# Patient Record
Sex: FEMALE | Race: WHITE | NOT HISPANIC OR LATINO | Employment: FULL TIME | URBAN - METROPOLITAN AREA
[De-identification: names, ages, dates, MRNs, and addresses within clinical notes are randomized per-mention and may not be internally consistent; named-entity substitution may affect disease eponyms.]

---

## 2017-03-31 ENCOUNTER — OFFICE VISIT (OUTPATIENT)
Dept: URGENT CARE | Facility: RETAIL CLINIC | Age: 47
End: 2017-03-31
Payer: COMMERCIAL

## 2017-03-31 VITALS
OXYGEN SATURATION: 99 % | DIASTOLIC BLOOD PRESSURE: 82 MMHG | HEART RATE: 77 BPM | SYSTOLIC BLOOD PRESSURE: 124 MMHG | TEMPERATURE: 99.5 F

## 2017-03-31 DIAGNOSIS — J06.9 ACUTE URI: ICD-10-CM

## 2017-03-31 DIAGNOSIS — R52 BODY ACHES: ICD-10-CM

## 2017-03-31 DIAGNOSIS — Z20.828 EXPOSURE TO INFLUENZA: Primary | ICD-10-CM

## 2017-03-31 LAB
FLUAV AG UPPER RESP QL IA.RAPID: NEGATIVE
FLUBV AG UPPER RESP QL IA.RAPID: NEGATIVE

## 2017-03-31 PROCEDURE — 87804 INFLUENZA ASSAY W/OPTIC: CPT | Mod: QW | Performed by: PHYSICIAN ASSISTANT

## 2017-03-31 PROCEDURE — 99213 OFFICE O/P EST LOW 20 MIN: CPT | Performed by: PHYSICIAN ASSISTANT

## 2017-03-31 RX ORDER — GUAIFENESIN 600 MG/1
1200 TABLET, EXTENDED RELEASE ORAL 2 TIMES DAILY
COMMUNITY
End: 2018-05-22

## 2017-03-31 NOTE — LETTER
[facility]  [address] [facility] [address]    March 31, 2017        Liliana Poole  [address] [address] [place] [id number]          To whom it may concern:    Patient was seen and evaluated today at our clinic to acute illness. Please excuse from work missed due to this illness today.    Please contact me for questions or concerns.        Sincerely,        Cristel Ann PA-C

## 2017-03-31 NOTE — PATIENT INSTRUCTIONS
Rapid A and B influenza test were negative    Your symptoms appear to be viral at this time.  Nasal congestion often starts clear then turns yellow or green towards the end- this is not a sign of a bacterial infection.  May use netti pot with bottled or distilled water and saline packets to flush sinuses.  Sudafed behind the pharmacist counter for 3-5 days helps relieve congestion  Take Tylenol or ibuprofen or naproxen as needed for pain.  Mucinex (guiafenesin) thins mucus and may help it to loosen more quickly  Saline drops or nasal sprays may loosen mucus.  Sit in the bathroom with the door closed and hot shower running to loosen mucus.  Contact primary care clinic if you do not have any relief from your symptoms after 10 days.  Present to emergency room for significantly increasing pain, persistent high fever >102F, swelling/redness around your eyes, changes in your vision or ability to move your eyes, altered mental status or a severe headache.    Follow up with primary care provider with any problems, questions or concerns or if symptoms worsen or fail to improve.

## 2017-03-31 NOTE — MR AVS SNAPSHOT
After Visit Summary   3/31/2017    Liliana Poole    MRN: [id number]           Patient Information     Date Of Birth          1970        Visit Information        Provider Department      3/31/2017 12:50 PM Cristel Ann PA-C [facility]        Today's Diagnoses     Exposure to influenza    -  1    Body aches        Acute URI          Care Instructions    Rapid A and B influenza test were negative    Your symptoms appear to be viral at this time.  Nasal congestion often starts clear then turns yellow or green towards the end- this is not a sign of a bacterial infection.  May use netti pot with bottled or distilled water and saline packets to flush sinuses.  Sudafed behind the pharmacist counter for 3-5 days helps relieve congestion  Take Tylenol or ibuprofen or naproxen as needed for pain.  Mucinex (guiafenesin) thins mucus and may help it to loosen more quickly  Saline drops or nasal sprays may loosen mucus.  Sit in the bathroom with the door closed and hot shower running to loosen mucus.  Contact primary care clinic if you do not have any relief from your symptoms after 10 days.  Present to emergency room for significantly increasing pain, persistent high fever >102F, swelling/redness around your eyes, changes in your vision or ability to move your eyes, altered mental status or a severe headache.    Follow up with primary care provider with any problems, questions or concerns or if symptoms worsen or fail to improve.          Follow-ups after your visit        Who to contact     You can reach your care team any time of the day by calling [phone].  Notification of test results:  If you have an abnormal lab result, we will notify you by phone as soon as possible.         Additional Information About Your Visit        MyChart Information     My [employer] [facility] lets you send messages to your doctor, view your test results, renew your prescriptions, schedule  "appointments and more. To sign up, go to [email or web address] . Click on \"Log in\" on the left side of the screen, which will take you to the Welcome page. Then click on \"Sign up Now\" on the right side of the page.     You will be asked to enter the access code listed below, as well as some personal information. Please follow the directions to create your username and password.     Your access code is: [email or web address]  Expires: 2017  1:37 PM     Your access code will  in 90 days. If you need help or a new code, please call your [place] clinic or [phone].        Care EveryWhere ID     This is your [facility] EveryWhere ID. This could be used by other organizations to access your [place] medical records  [id number]        Your Vitals Were     Pulse Temperature Pulse Oximetry             77 99.5  F (37.5  C) (Oral) 99%          Blood Pressure from Last 3 Encounters:   17 124/82   10/03/16 124/73   16 112/69    Weight from Last 3 Encounters:   10/03/16 211 lb 4.8 oz (95.8 kg)   16 202 lb (91.6 kg)   14 203 lb (92.1 kg)              We Performed the Following     INFLUENZA A/B ANTIGEN        Primary Care Provider Office Phone # Fax #    Francisca Long [phone]       [address] [address]        Thank you!     Thank you for choosing [facility]  for your care. Our goal is always to provide you with excellent care. Hearing back from our patients is one way we can continue to improve our services. Please take a few minutes to complete the written survey that you may receive in the mail after your visit with us. Thank you!             Your Updated Medication List - Protect others around you: Learn how to safely use, store and throw away your medicines at www.disposemymeds.org.          This list is accurate as of: 3/31/17  1:37 PM.  Always use your most recent med list.                   Brand Name Dispense " Instructions for use    aspirin 162 MG EC tablet      Take 162 mg by mouth daily       celecoxib 200 MG capsule    celeBREX    21 capsule    Take 1 capsule (200 mg) by mouth daily for 21 days       MOTRIN IB PO          MUCINEX 600 MG 12 hr tablet   Generic drug:  guaiFENesin      Take 1,200 mg by mouth 2 times daily       oxyCODONE-acetaminophen 5-325 MG per tablet    PERCOCET    60 tablet    Take 1-2 tablets by mouth every 6 hours as needed for moderate to severe pain       REGLAN PO      Take 10 mg by mouth       RELPAX 40 MG tablet   Generic drug:  eletriptan      Take 40 mg by mouth at onset of headache for migraine As needed       scopolamine 72 hr patch    TRANSDERM     Place 1 patch onto the skin every 72 hours Reported on 3/31/2017       SUDAFED PO          TYLENOL PO          ZOFRAN PO

## 2017-03-31 NOTE — PROGRESS NOTES
Chief Complaint   Patient presents with     Sinus Problem     pressure, congestion in face; 1-2 days     Cough     1-2 days     Chills     began 2 nights ago     Headache     began 2 nights ago     Generalized Body Aches     1-2 days          SUBJECTIVE:  Patient presents with flu-like symptoms:  Fevers, chills, myalgias, sinus congestion began 2 days ago.  Denies dyspnea or wheezing. Did not get a flu shot. Works in health care    Past Medical History:   Diagnosis Date     GERD (gastroesophageal reflux disease)      Hip pain      History of Helicobacter pylori infection      Hyperlipidaemia      Hypertension      Insomnia      Meniere's disease      Migraines      Motion sickness      Obese      Patent foramen ovale      PFO (patent foramen ovale)     SUKHWINDER 8/18/2010,echo 6/6/2008     PONV (postoperative nausea and vomiting)      Current Outpatient Prescriptions   Medication Sig Dispense Refill     guaiFENesin (MUCINEX) 600 MG 12 hr tablet Take 1,200 mg by mouth 2 times daily       Acetaminophen (TYLENOL PO)        Pseudoephedrine HCl (SUDAFED PO)        MOTRIN IB PO        Ondansetron HCl (ZOFRAN PO)        Metoclopramide HCl (REGLAN PO) Take 10 mg by mouth       aspirin 162 MG EC tablet Take 162 mg by mouth daily       eletriptan (RELPAX) 40 MG tablet Take 40 mg by mouth at onset of headache for migraine As needed       scopolamine (TRANSDERM) (1.5mg base/3day) patch Place 1 patch onto the skin every 72 hours Reported on 3/31/2017       oxyCODONE-acetaminophen (PERCOCET) 5-325 MG per tablet Take 1-2 tablets by mouth every 6 hours as needed for moderate to severe pain (Patient not taking: Reported on 3/31/2017) 60 tablet 0     celecoxib (CELEBREX) 200 MG capsule Take 1 capsule (200 mg) by mouth daily for 21 days 21 capsule 0        Allergies   Allergen Reactions     Atorvastatin      Muscle aches     Contrast Dye Hives     Crestor [Rosuvastatin]      Muscle aches     Pravastatin      Muscle aches     Simvastatin       Muscle aches     Sulfa Drugs      Augmented Betamethasone Diprop [Betamethasone] Rash     Augmentin Rash     Verapamil Rash        History   Smoking Status     Never Smoker   Smokeless Tobacco     Not on file       OBJECITVE;  /82 (BP Location: Left arm)  Pulse 77  Temp 99.5  F (37.5  C) (Oral)  SpO2 99%  Appears moderately ill but not toxic.  EARS:  Normal.  Nose:: mucosa pink, boggy  THROAT AND PHARYNX:  Normal.  NECK: supple; no adenopathy in the neck.  SINUSES: non tender.  CHEST:  clear.    Rapid influenza A negative, influenza B negative    ASSESSMENT:  (Z20.828) Exposure to influenza  (primary encounter diagnosis)  (R52) Body aches  (J06.9) Acute URI      PLAN:   Rapid A and B influenza test were negative  Your symptoms appear to be viral at this time.  Nasal congestion often starts clear then turns yellow or green towards the end- this is not a sign of a bacterial infection.  May use netti pot with bottled or distilled water and saline packets to flush sinuses.  Sudafed behind the pharmacist counter for 3-5 days helps relieve congestion  Take Tylenol or ibuprofen or naproxen as needed for pain.  Mucinex (guiafenesin) thins mucus and may help it to loosen more quickly  Saline drops or nasal sprays may loosen mucus.  Sit in the bathroom with the door closed and hot shower running to loosen mucus.  Contact primary care clinic if you do not have any relief from your symptoms after 10 days.  Present to emergency room for significantly increasing pain, persistent high fever >102F, swelling/redness around your eyes, changes in your vision or ability to move your eyes, altered mental status or a severe headache.  Follow up with primary care provider with any problems, questions or concerns or if symptoms worsen or fail to improve.    Cristel Ann PA-C  [facility]

## 2017-03-31 NOTE — NURSING NOTE
"Chief Complaint   Patient presents with     Sinus Problem     pressure, congestion in face; 1-2 days     Cough     1-2 days     Chills     began 2 nights ago     Headache     began 2 nights ago     Generalized Body Aches     1-2 days       Initial /82 (BP Location: Left arm)  Pulse 77  Temp 99.5  F (37.5  C) (Oral)  SpO2 99% Estimated body mass index is 35.16 kg/(m^2) as calculated from the following:    Height as of 10/3/16: 5' 5\" (1.651 m).    Weight as of 10/3/16: 211 lb 4.8 oz (95.8 kg).  Medication Reconciliation: complete  "

## 2017-03-31 NOTE — LETTER
[facility]  [address] [facility] [address]  Phone: [phone]    March 31, 2017        Liliana Poole  [address] [address] [place] [id number]          To whom it may concern:    RE: Liliana Poole    Patient was seen and evaluated today at our clinic to acute illness. Please excuse from work missed due to this illness today.    Please contact me for questions or concerns.      Sincerely,        Cristel Ann PA-C

## 2017-10-02 ENCOUNTER — TELEPHONE (OUTPATIENT)
Dept: OTOLARYNGOLOGY | Facility: OTHER | Age: 47
End: 2017-10-02

## 2017-10-02 ENCOUNTER — OFFICE VISIT (OUTPATIENT)
Dept: URGENT CARE | Facility: RETAIL CLINIC | Age: 47
End: 2017-10-02
Payer: COMMERCIAL

## 2017-10-02 VITALS — HEART RATE: 67 BPM | SYSTOLIC BLOOD PRESSURE: 133 MMHG | DIASTOLIC BLOOD PRESSURE: 79 MMHG | TEMPERATURE: 98.4 F

## 2017-10-02 DIAGNOSIS — H65.02 ACUTE SEROUS OTITIS MEDIA OF LEFT EAR, RECURRENCE NOT SPECIFIED: Primary | ICD-10-CM

## 2017-10-02 DIAGNOSIS — R09.81 NASAL SINUS CONGESTION: ICD-10-CM

## 2017-10-02 DIAGNOSIS — H92.02 LEFT EAR PAIN: ICD-10-CM

## 2017-10-02 DIAGNOSIS — H81.09 MENIERE'S DISEASE, UNSPECIFIED LATERALITY: Primary | ICD-10-CM

## 2017-10-02 DIAGNOSIS — Z86.69 HISTORY OF MENIERE'S DISEASE: ICD-10-CM

## 2017-10-02 PROCEDURE — 99213 OFFICE O/P EST LOW 20 MIN: CPT | Performed by: PHYSICIAN ASSISTANT

## 2017-10-02 RX ORDER — ONDANSETRON 4 MG/1
4 TABLET, FILM COATED ORAL EVERY 8 HOURS PRN
Qty: 18 TABLET | Refills: 0 | Status: SHIPPED | OUTPATIENT
Start: 2017-10-02

## 2017-10-02 RX ORDER — METHYLPREDNISOLONE 4 MG
TABLET, DOSE PACK ORAL
Qty: 21 TABLET | Refills: 0 | Status: SHIPPED | OUTPATIENT
Start: 2017-10-02 | End: 2018-05-22

## 2017-10-02 RX ORDER — HYDROXYZINE PAMOATE 25 MG/1
CAPSULE ORAL
COMMUNITY
Start: 2017-08-28 | End: 2018-05-22

## 2017-10-02 RX ORDER — CEPHALEXIN 500 MG/1
500 CAPSULE ORAL 2 TIMES DAILY
Qty: 20 CAPSULE | Refills: 0 | Status: SHIPPED | OUTPATIENT
Start: 2017-10-02 | End: 2018-05-22

## 2017-10-02 RX ORDER — PREDNISONE 20 MG/1
20 TABLET ORAL DAILY
Qty: 5 TABLET | Refills: 0 | Status: SHIPPED | OUTPATIENT
Start: 2017-10-02 | End: 2018-05-22

## 2017-10-02 NOTE — PROGRESS NOTES
Chief Complaint   Patient presents with     Otalgia     Left; 4-5 days; very painful, migrating down her neck; using sudafed, antihistamines; pt has meniers disease     Dizziness     Nausea         SUBJECTIVE:   Pt. presenting to [facility] -  with a chief complaint of sense of fullness and pain left ear, nasal sinus congestion, tired, mild malaise. . No SOB or chest pain .  Onset of symptoms gradual x 5 days  Course of illness is worsening.    Severity moderate  Current and Associated symptoms: runny nose, stuffy nose, ear pain left, facial pain/pressure and mild malaise  Treatment measures tried include Decongestants, Fluids and Rest.  Predisposing factors include hx of menieres, tried to see ENT today  -see tel enc 11:40 am  Last antibiotic > year  Took steroids spring 2017 for menieres flare    Pregnancy no  Smoker no    ROS:  Afebrile   Energy level is sl <  ENT - denies ear pain, throat pain. some nasal congestion  CP - no cough,SOB or chest pain   GI- - appetite normal. No nausea, vomiting or diarrhea.   No bowel or bladder changes   MSK - no joint pain or swelling   Skin: no rashes      Request refill Zofran in case she devlops nausea from Meniers    Past Medical History:   Diagnosis Date     GERD (gastroesophageal reflux disease)      Hip pain      History of Helicobacter pylori infection      Hyperlipidaemia      Hypertension      Insomnia      Meniere's disease      Migraines      Motion sickness      Obese      Patent foramen ovale      PFO (patent foramen ovale)     SUKHWINDER 8/18/2010,echo 6/6/2008     PONV (postoperative nausea and vomiting)      Past Surgical History:   Procedure Laterality Date     ARTHROSCOPY ANKLE, OPEN REPAIR LIGAMENT, COMBINED Right 10/3/2016    Procedure: COMBINED ARTHROSCOPY ANKLE, OPEN REPAIR LIGAMENT;  Surgeon: Mk Stephenson MD;  Location: [facility]     CHOLECYSTECTOMY       FINGER SURGERY      fx of right little finger     LAPAROSCOPIC CHOLECYSTECTOMY  2011      ROTATOR CUFF REPAIR RT/LT       ROTATOR CUFF REPAIR RT/LT       uterine ablation       varicose vein ablation       Patient Active Problem List   Diagnosis     Family history of ischemic heart disease (FAMILY HX-ISCHEM HEART DIS ISCHEM CARDIOVASCULAR ISCHEMIC)     Mixed hyperlipidemia     Ostium secundum type atrial septal defect (FORAMEN)     Current Outpatient Prescriptions   Medication     Pseudoephedrine HCl (SUDAFED PO)     Ondansetron HCl (ZOFRAN PO)     guaiFENesin (MUCINEX) 600 MG 12 hr tablet     Acetaminophen (TYLENOL PO)     MOTRIN IB PO     Metoclopramide HCl (REGLAN PO)     scopolamine (TRANSDERM) (1.5mg base/3day) patch     oxyCODONE-acetaminophen (PERCOCET) 5-325 MG per tablet     celecoxib (CELEBREX) 200 MG capsule     aspirin 162 MG EC tablet     eletriptan (RELPAX) 40 MG tablet     No current facility-administered medications for this visit.          OBJECTIVE: /79 (BP Location: Left arm)  Pulse 67  Temp 98.4  F (36.9  C) (Oral)          GENERAL APPEARANCE: cooperative, alert and no distress. Appears well hydrated.  EYES: conjunctiva clear  HENT: Rt ear canal  clear and TM normal   Lt ear canal clear and TM abn - mild erythema and no light reflex - distorted landmarks  Nose some congestion. no discharge  Mouth without ulcers or lesions. no erythema. no exudate.   NECK: supple, few small shoddy NT ant nodes. No  posterior nodes.  RESP: lungs clear to auscultation - no rales, rhonchi or wheezes. Breathing easily.  CV: regular rates and rhythm  ABDOMEN:  soft, nontender, no HSM or masses and bowel sounds normal   SKIN: no suspicious lesions or rashes  no tenderness to palpate over  sinus areas.      ASSESSMENT:     Left ear pain  History of Meniere's disease  Acute serous otitis media of left ear, recurrence not specified  Nasal sinus congestion      PLAN:  Symptomatic measures   Prescriptions as below. Discussed indications, dosing, side affects and adverse reactions of medications with   Patient - Ceph, Zofran and Prednisone  Eat yogurt daily or take a probiotic supplement when on antibiotics.  saline nasal spray - gentle autoinsufflation - discussed probable ETD    Cool mist vaporizer.  Stay in clean air environment.  > rest.  > fluids.  Contagiousness and hygiene discussed.  Fever and pain  control measures discussed.   If unable to swallow or any breathing difficulty to go to ED     FOLLOW UP with ENT/PCP as planned        Pt is comfortable with this plan.  Electronically signed,  VICKY Mckenna, PAC

## 2017-10-02 NOTE — NURSING NOTE
"Chief Complaint   Patient presents with     Otalgia     Left; 4-5 days; very painful, migrating down her neck; using sudafed, antihistamines; pt has meniers disease     Dizziness     Nausea       Initial /79 (BP Location: Left arm)  Pulse 67  Temp 98.4  F (36.9  C) (Oral) Estimated body mass index is 35.16 kg/(m^2) as calculated from the following:    Height as of 10/3/16: 5' 5\" (1.651 m).    Weight as of 10/3/16: 211 lb 4.8 oz (95.8 kg).  Medication Reconciliation: complete   "

## 2017-10-02 NOTE — TELEPHONE ENCOUNTER
Reason for Call:  Same Day Appointment, Requested Provider:  L[place]     PCP: Francisca Alves    Reason for visit: ears    Duration of symptoms: on going    Have you been treated for this in the past? Yes    Additional comments: First opening is for 110/01/2017 patient would like top be seen before this     Can we leave a detailed message on this number? YES    Phone number patient can be reached at:     Telephone Information:   Mobile [phone]       Best Time: anytime    Call taken on 10/2/2017 at 11:17 AM by Susan Haddad

## 2017-10-02 NOTE — PATIENT INSTRUCTIONS
Please FOLLOW UP at primary care clinic if not improving, new symptoms, worse or this does not resolve.  [facility]  [phone]

## 2017-10-02 NOTE — TELEPHONE ENCOUNTER
SHe can try a medrol dose pack. I sent this to pharmacy [facility] in [facility]. Please let her know. Also advise her to eat a low salt diet, avoid caffeine, chocolate, and alcohol.

## 2017-10-02 NOTE — TELEPHONE ENCOUNTER
Dr. Golden, are you willing to review as Dr. Ha is out of town until 10/12/17?      Returned patient call. Informed her that MD is out of clinic for a few weeks. Patient is a former [facility] patient transitioning to Dr. Ha through [facility]. Patient has Ménière disease and is having pain to her left ear, radiating to left eustachian tube. Pain 6/10 described as pressure. Onset gradual beginning 3 days ago. Last time symptoms arose was last spring. Per patient report, Was prescribed a medrol pack (helped), antihistamine's (makes her too tired but willing to try), and hydrochlorothiazide (didn't tolerate, caused migraines). Would like any scripts sent to [facility] [place].    Writer offered to make her an appointment with another ENT provider in [place] or route to another provider to see if they could review. Patient chose 2nd option. Will route to Dr. Golden in [place]. Writer will check on encounter when I'm back in the clinic Wednesday. Patient verbalizes understanding and will call back if needed.  Kimi Plummer RN  [facility]  10/2/2017 11:54 AM

## 2017-10-02 NOTE — MR AVS SNAPSHOT
"              After Visit Summary   10/2/2017    Liliana Poole    MRN: [id number]           Patient Information     Date Of Birth          1970        Visit Information        Provider Department      10/2/2017 12:50 PM Payal Mckenna PA-C [facility]        Today's Diagnoses     Left ear pain    -  1    History of Meniere's disease        Acute serous otitis media of left ear, recurrence not specified        Nasal sinus congestion          Care Instructions      Please FOLLOW UP at primary care clinic if not improving, new symptoms, worse or this does not resolve.  [facility]  [phone]            Follow-ups after your visit        Who to contact     You can reach your care team any time of the day by calling [phone].  Notification of test results:  If you have an abnormal lab result, we will notify you by phone as soon as possible.         Additional Information About Your Visit        MyChart Information     [employer] lets you send messages to your doctor, view your test results, renew your prescriptions, schedule appointments and more. To sign up, go to [email or web address] . Click on \"Log in\" on the left side of the screen, which will take you to the Welcome page. Then click on \"Sign up Now\" on the right side of the page.     You will be asked to enter the access code listed below, as well as some personal information. Please follow the directions to create your username and password.     Your access code is: [email or web address]  Expires: 2017  2:02 PM     Your access code will  in 90 days. If you need help or a new code, please call your [facility] or [phone].        Care EveryWhere ID     This is your Care EveryWhere ID. This could be used by other organizations to access your [place] medical records  [id number]        Your Vitals Were     Pulse Temperature                67 98.4  F (36.9  C) (Oral)           Blood Pressure from " Last 3 Encounters:   10/02/17 133/79   03/31/17 124/82   10/03/16 124/73    Weight from Last 3 Encounters:   10/03/16 211 lb 4.8 oz (95.8 kg)   09/30/16 202 lb (91.6 kg)   05/28/14 203 lb (92.1 kg)              Today, you had the following     No orders found for display         Today's Medication Changes          These changes are accurate as of: 10/2/17  2:02 PM.  If you have any questions, ask your nurse or doctor.               Start taking these medicines.        Dose/Directions    cephALEXin 500 MG capsule   Commonly known as:  KEFLEX   Used for:  Acute serous otitis media of left ear, recurrence not specified   Started by:  Payal Mckenna PA-C        Dose:  500 mg   Take 1 capsule (500 mg) by mouth 2 times daily   Quantity:  20 capsule   Refills:  0       predniSONE 20 MG tablet   Commonly known as:  DELTASONE   Used for:  History of Meniere's disease, Acute serous otitis media of left ear, recurrence not specified, Nasal sinus congestion   Started by:  Payal Mckenna PA-C        Dose:  20 mg   Take 1 tablet (20 mg) by mouth daily   Quantity:  5 tablet   Refills:  0         These medicines have changed or have updated prescriptions.        Dose/Directions    * ZOFRAN PO   This may have changed:  Another medication with the same name was added. Make sure you understand how and when to take each.        Refills:  0       * ondansetron 4 MG tablet   Commonly known as:  ZOFRAN   This may have changed:  You were already taking a medication with the same name, and this prescription was added. Make sure you understand how and when to take each.   Used for:  History of Meniere's disease   Changed by:  Payal Mckenna PA-C        Dose:  4 mg   Take 1 tablet (4 mg) by mouth every 8 hours as needed for nausea   Quantity:  18 tablet   Refills:  0       * Notice:  This list has 2 medication(s) that are the same as other medications prescribed for you. Read the directions carefully, and ask your doctor or other  care provider to review them with you.         Where to get your medicines      These medications were sent to [facility] [address] [place], [place] - [address] [facility]  [address] [facility] [address]     Phone:  [phone]     cephALEXin 500 MG capsule    ondansetron 4 MG tablet    predniSONE 20 MG tablet                Primary Care Provider Office Phone # Fax #    Francisca Alves [phone]       [facility] [address] [facility] [address]        Equal Access to Services     [facility]: Hadii aad ku hadasho Soomaali, waaxda luqadaha, qaybta kaalmada adeegyada, waxay uzielin hayaan manuel best . So [facility] [phone].    ATENCIÓN: Si habla español, tiene a hunter disposición servicios gratuitos de asistencia lingüística. [facility] [phone].    We comply with applicable federal civil rights laws and [place] laws. We do not discriminate on the basis of race, color, national origin, age, disability, sex, sexual orientation, or gender identity.            Thank you!     Thank you for choosing [facility]  for your care. Our goal is always to provide you with excellent care. Hearing back from our patients is one way we can continue to improve our services. Please take a few minutes to complete the written survey that you may receive in the mail after your visit with us. Thank you!             Your Updated Medication List - Protect others around you: Learn how to safely use, store and throw away your medicines at www.disposemymeds.org.          This list is accurate as of: 10/2/17  2:02 PM.  Always use your most recent med list.                   Brand Name Dispense Instructions for use Diagnosis    aspirin 162 MG EC tablet      Take 162 mg by mouth daily        celecoxib 200 MG capsule    celeBREX    21 capsule    Take 1 capsule (200 mg) by mouth daily for 21 days    Hip pain, right       cephALEXin 500 MG capsule    KEFLEX    20 capsule    Take  1 capsule (500 mg) by mouth 2 times daily    Acute serous otitis media of left ear, recurrence not specified       hydrOXYzine 25 MG capsule    VISTARIL          MOTRIN IB PO           MUCINEX 600 MG 12 hr tablet   Generic drug:  guaiFENesin      Take 1,200 mg by mouth 2 times daily        oxyCODONE-acetaminophen 5-325 MG per tablet    PERCOCET    60 tablet    Take 1-2 tablets by mouth every 6 hours as needed for moderate to severe pain    Hip pain, right       predniSONE 20 MG tablet    DELTASONE    5 tablet    Take 1 tablet (20 mg) by mouth daily    History of Meniere's disease, Acute serous otitis media of left ear, recurrence not specified, Nasal sinus congestion       REGLAN PO      Take 10 mg by mouth        RELPAX 40 MG tablet   Generic drug:  eletriptan      Take 40 mg by mouth at onset of headache for migraine As needed        scopolamine 72 hr patch    TRANSDERM     Place 1 patch onto the skin every 72 hours Reported on 3/31/2017        SUDAFED PO           TYLENOL PO           * ZOFRAN PO           * ondansetron 4 MG tablet    ZOFRAN    18 tablet    Take 1 tablet (4 mg) by mouth every 8 hours as needed for nausea    History of Meniere's disease       * Notice:  This list has 2 medication(s) that are the same as other medications prescribed for you. Read the directions carefully, and ask your doctor or other care provider to review them with you.

## 2017-10-04 NOTE — TELEPHONE ENCOUNTER
Called patient and informed her of the below message.  Kimi Plummer RN  [facility]  10/4/2017 3:31 PM

## 2017-11-01 ENCOUNTER — OFFICE VISIT (OUTPATIENT)
Dept: OTOLARYNGOLOGY | Facility: OTHER | Age: 47
End: 2017-11-01
Payer: COMMERCIAL

## 2017-11-01 ENCOUNTER — OFFICE VISIT (OUTPATIENT)
Dept: AUDIOLOGY | Facility: OTHER | Age: 47
End: 2017-11-01
Payer: COMMERCIAL

## 2017-11-01 VITALS — HEART RATE: 85 BPM | RESPIRATION RATE: 18 BRPM | OXYGEN SATURATION: 99 %

## 2017-11-01 DIAGNOSIS — G43.909 MIGRAINE WITHOUT STATUS MIGRAINOSUS, NOT INTRACTABLE, UNSPECIFIED MIGRAINE TYPE: ICD-10-CM

## 2017-11-01 DIAGNOSIS — H93.13 TINNITUS OF BOTH EARS: ICD-10-CM

## 2017-11-01 DIAGNOSIS — H90.3 SENSORINEURAL HEARING LOSS, ASYMMETRICAL: Primary | ICD-10-CM

## 2017-11-01 DIAGNOSIS — H81.03 MENIERE'S DISEASE, BILATERAL: Primary | ICD-10-CM

## 2017-11-01 DIAGNOSIS — H90.6 MIXED CONDUCTIVE AND SENSORINEURAL HEARING LOSS OF BOTH EARS: ICD-10-CM

## 2017-11-01 DIAGNOSIS — M54.2 TENDERNESS OF NECK: ICD-10-CM

## 2017-11-01 PROCEDURE — 99207 ZZC NO CHARGE LOS: CPT | Performed by: AUDIOLOGIST

## 2017-11-01 PROCEDURE — 92567 TYMPANOMETRY: CPT | Performed by: AUDIOLOGIST

## 2017-11-01 PROCEDURE — 92557 COMPREHENSIVE HEARING TEST: CPT | Performed by: AUDIOLOGIST

## 2017-11-01 PROCEDURE — 99214 OFFICE O/P EST MOD 30 MIN: CPT | Performed by: OTOLARYNGOLOGY

## 2017-11-01 NOTE — LETTER
11/1/2017         RE: Liliana Poole  [address] [address] [place] [id number]        Dear Colleague,    Thank you for referring your patient, Liliana Poole, to the [facility]. Please see a copy of my visit note below.    ENT Consultation    Liliana Poole is a 47 year old female who is seen in consultation at the request of former [facility] pt.       History of Present Illness - Liliana Poole is a 47 year old female here today for Meniere's disease. Has had several episodes of pain and pressure in the left ear. She has recently been on a course of prednisone from the [facility] that did help for a few days with pain, but shortly became infective. Patient has a history of severe migraines. She has been on dieretics in the past that trigger her migraines. She has also tried Verapamil that presented with a rash.    Past Medical History -   Past Medical History:   Diagnosis Date     GERD (gastroesophageal reflux disease)      Hip pain      History of Helicobacter pylori infection      Hyperlipidaemia      Hypertension      Insomnia      Meniere's disease      Migraines      Motion sickness      Obese      Patent foramen ovale      PFO (patent foramen ovale)     SUKHWINDER 8/18/2010,echo 6/6/2008     PONV (postoperative nausea and vomiting)        Current Medications -   Current Outpatient Prescriptions:      hydrOXYzine (VISTARIL) 25 MG capsule, , Disp: , Rfl:      ondansetron (ZOFRAN) 4 MG tablet, Take 1 tablet (4 mg) by mouth every 8 hours as needed for nausea, Disp: 18 tablet, Rfl: 0     predniSONE (DELTASONE) 20 MG tablet, Take 1 tablet (20 mg) by mouth daily, Disp: 5 tablet, Rfl: 0     cephALEXin (KEFLEX) 500 MG capsule, Take 1 capsule (500 mg) by mouth 2 times daily, Disp: 20 capsule, Rfl: 0     methylPREDNISolone (MEDROL DOSEPAK) 4 MG tablet, Follow package instructions, Disp: 21 tablet, Rfl: 0     guaiFENesin (MUCINEX) 600 MG 12 hr tablet, Take 1,200 mg by  mouth 2 times daily, Disp: , Rfl:      Acetaminophen (TYLENOL PO), , Disp: , Rfl:      Pseudoephedrine HCl (SUDAFED PO), , Disp: , Rfl:      MOTRIN IB PO, , Disp: , Rfl:      Ondansetron HCl (ZOFRAN PO), , Disp: , Rfl:      Metoclopramide HCl (REGLAN PO), Take 10 mg by mouth, Disp: , Rfl:      scopolamine (TRANSDERM) (1.5mg base/3day) patch, Place 1 patch onto the skin every 72 hours Reported on 3/31/2017, Disp: , Rfl:      oxyCODONE-acetaminophen (PERCOCET) 5-325 MG per tablet, Take 1-2 tablets by mouth every 6 hours as needed for moderate to severe pain (Patient not taking: Reported on 3/31/2017), Disp: 60 tablet, Rfl: 0     celecoxib (CELEBREX) 200 MG capsule, Take 1 capsule (200 mg) by mouth daily for 21 days, Disp: 21 capsule, Rfl: 0     aspirin 162 MG EC tablet, Take 162 mg by mouth daily, Disp: , Rfl:      eletriptan (RELPAX) 40 MG tablet, Take 40 mg by mouth at onset of headache for migraine As needed, Disp: , Rfl:     Allergies -   Allergies   Allergen Reactions     Atorvastatin      Muscle aches     Contrast Dye Hives     Crestor [Rosuvastatin]      Muscle aches     Pravastatin      Muscle aches     Simvastatin      Muscle aches     Sulfa Drugs      Augmented Betamethasone Diprop [Betamethasone] Rash     Augmentin Rash     Verapamil Rash       Social History -   Social History     Social History     Marital status: Single     Spouse name: N/A     Number of children: N/A     Years of education: N/A     Social History Main Topics     Smoking status: Never Smoker     Smokeless tobacco: Not on file     Alcohol use Yes     Drug use: No     Sexual activity: Not on file     Other Topics Concern     Exercise Yes     treadmill, weights, kettlebells     Social History Narrative       Family History -   Family History   Problem Relation Age of Onset     Hypertension Mother      Hypertension Father      C.A.D. Father      CABG in 60s       Review of Systems - As per HPI and PMHx, otherwise review of system review of  the head and neck negative.    Physical Exam  There were no vitals taken for this visit.  BMI: There is no height or weight on file to calculate BMI.    General - The patient is well nourished and well developed, and appears to have good nutritional status.  Alert and oriented to person and place, answers questions and cooperates with examination appropriately.    SKIN - No suspicious lesions or rashes.  Respiration - No respiratory distress.     Head and Face - Normocephalic and atraumatic, with no gross asymmetry noted of the contour of the facial features.  The facial nerve is intact, with strong symmetric movements.    Voice and Breathing - The patient was breathing comfortably without the use of accessory muscles. There was no wheezing, stridor, or stertor.  The patients voice was clear and strong, and had appropriate pitch and quality.    Ears - Bilateral pinna and EACs with normal appearing overlying skin. Tympanic membrane intact with good mobility on pneumatic otoscopy bilaterally. Bony landmarks of the ossicular chain are normal. The tympanic membranes are normal in appearance. No retraction, perforation, or masses.  No fluid or purulence was seen in the external canal or the middle ear.     Eyes - Extraocular movements intact.  Sclera were not icteric or injected, conjunctiva were pink and moist.    Mouth - Examination of the oral cavity showed pink, healthy oral mucosa. No lesions or ulcerations noted.  The tongue was mobile and midline, and the dentition were in good condition.      Throat - The walls of the oropharynx were smooth, pink, moist, symmetric, and had no lesions or ulcerations.  The tonsillar pillars and soft palate were symmetric.  The uvula was midline on elevation.    Neck - Normal midline excursion of the laryngotracheal complex during swallowing.  Full range of motion on passive movement.  Palpation of the occipital, submental, submandibular, internal jugular chain, and supraclavicular  nodes did not demonstrate any abnormal lymph nodes or masses.  The carotid pulse was palpable bilaterally.  Palpation of the thyroid was soft and smooth, with no nodules or goiter appreciated.  The trachea was mobile and midline. SCM tenderness.    Nose - External contour is symmetric, no gross deflection or scars.  Nasal mucosa is pink and moist with no abnormal mucus.  The septum was midline and non-obstructive, turbinates of normal size and position.  No polyps, masses, or purulence noted on examination.    Neuro - Nonfocal neuro exam is normal, CN 2 through 12 intact, normal gait and muscle tone. Rombreg with eyes closed with unsteadiness and a backwards fall.        Performed in clinic today:  Audiologic Studies - An audiogram and tympanogram were performed today as part of the evaluation and personally reviewed. The tympanogram shows normal Type A curves, with normal canal volumes and middle ear pressures.  There is no sign of eustachian tube dysfunction or middle ear effusion.  The audiogram was also normal.  Patient had 100% word recognition. Patient exhibits mild borderline hearing loss in the right ear and mild hearing loss in the left ear.           A/P - Liliana Poloe is a 47 year old female with previous diagnosis of Meniere's left side but now also significant elements of Vestibular Migraine. WE  her on this disorder and answer many questions. Plan therapy with Amytriptalline instead of Calcium channel blockers to which she had rash reaction and also has low BP.  WE spent 45 min with the patient more than half in counseling. Even steroid ME perfusion is discussed as future option.          This document serves as a record of the services and decisions personally performed and made by Dr. Julian Ha MD. It was created on his behalf by Ewelina Fritz, a trained medical scribe. The creation of this document is based the provider's statements to the medical scribe.  Ewelina Fritz  10:23 AM 11/1/2017    Provider:   The information in this document, created by the medical scribe for me, accurately reflects the services I personally performed and the decisions made by me. I have reviewed and approved this document for accuracy prior to leaving the patient care area.  Dr. Julian Ha MD 10:23 AM 11/1/2017    Julian Ha MD      Again, thank you for allowing me to participate in the care of your patient.        Sincerely,        Julian Ha MD, MD

## 2017-11-01 NOTE — MR AVS SNAPSHOT
After Visit Summary   11/1/2017    Liliana Poole    MRN: [id number]           Patient Information     Date Of Birth          1970        Visit Information        Provider Department      11/1/2017 9:30 AM Anson Garcia AuD [facility]        Today's Diagnoses     Sensorineural hearing loss, asymmetrical    -  1    Tinnitus of both ears           Follow-ups after your visit        Your next 10 appointments already scheduled     Nov 08, 2017 11:20 AM CST   (Arrive by 11:05 AM)   [facility] Spine with Kayode Varela PT   [facility] ([facility]  )    [address]  [facility] [address]   [phone]            Nov 14, 2017 12:50 PM CST   SHANNON Spine with Kayode Varela PT   [facility] ([facility]  )    [address]  [facility] [address]   [phone]            Dec 06, 2017 10:30 AM CST   Return Visit with Julian Ha MD   [facility]    [address] [place] [address]  [address]  [facility] [address] [phone]              Who to contact     If you have questions or need follow up information about today's clinic visit or your schedule please contact [facility] directly at [phone].  Normal or non-critical lab and imaging results will be communicated to you by MyChart, letter or phone within 4 business days after the clinic has received the results. If you do not hear from us within 7 days, please contact the clinic through MyChart or phone. If you have a critical or abnormal lab result, we will notify you by phone as soon as possible.  Submit refill requests through [employer] or call your pharmacy and they will forward the refill request to us. Please allow 3 business days for your refill to be completed.          Additional Information About Your Visit        MyChart Information      "[employer] lets you send messages to your doctor, view your test results, renew your prescriptions, schedule appointments and more. To sign up, go to [email or web address] . Click on \"Log in\" on the left side of the screen, which will take you to the Welcome page. Then click on \"Sign up Now\" on the right side of the page.     You will be asked to enter the access code listed below, as well as some personal information. Please follow the directions to create your username and password.     Your access code is: [email or web address]  Expires: 2017  2:02 PM     Your access code will  in 90 days. If you need help or a new code, please call your [place] clinic or [phone].        Care EveryWhere ID     This is your Care EveryWhere ID. This could be used by other organizations to access your [place] medical records  [id number]         Blood Pressure from Last 3 Encounters:   10/02/17 133/79   17 124/82   10/03/16 124/73    Weight from Last 3 Encounters:   10/03/16 211 lb 4.8 oz (95.8 kg)   16 202 lb (91.6 kg)   14 203 lb (92.1 kg)              We Performed the Following     AUDIOGRAM/TYMPANOGRAM - INTERFACE     COMPREHENSIVE HEARING TEST     TYMPANOMETRY          Today's Medication Changes          These changes are accurate as of: 17 12:09 PM.  If you have any questions, ask your nurse or doctor.               Start taking these medicines.        Dose/Directions    amitriptyline 25 MG tablet   Commonly known as:  ELAVIL   Used for:  Migraine without status migrainosus, not intractable, unspecified migraine type   Started by:  Julian Ha MD        Dose:  25 mg   Take 1 tablet (25 mg) by mouth At Bedtime   Quantity:  30 tablet   Refills:  1            Where to get your medicines      [facility]  [place] [place] [facility] [address] [facility]  [address] [facility] [address]     Phone:  [phone]     amitriptyline 25 MG " tablet                Primary Care Provider Office Phone # Fax #    Francisca Alves [phone]       [facility] [address] [facility] [address]        Equal Access to Services     DAVID NUNEZ : Hadii aad ku hadremio Solilianali, waaxda luqadaha, qaybta kaalmada adealiciada, trevor uzielin hayaaalfredo zamudiodaxarah del rosario. So [facility] [phone].    ATENCIÓN: Si habla español, tiene a hunter disposición servicios gratuitos de asistencia lingüística. [facility] [phone].    We comply with applicable federal civil rights laws and [place] laws. We do not discriminate on the basis of race, color, national origin, age, disability, sex, sexual orientation, or gender identity.            Thank you!     Thank you for choosing [facility]  for your care. Our goal is always to provide you with excellent care. Hearing back from our patients is one way we can continue to improve our services. Please take a few minutes to complete the written survey that you may receive in the mail after your visit with us. Thank you!             Your Updated Medication List - Protect others around you: Learn how to safely use, store and throw away your medicines at www.disposemymeds.org.          This list is accurate as of: 11/1/17 12:09 PM.  Always use your most recent med list.                   Brand Name Dispense Instructions for use Diagnosis    amitriptyline 25 MG tablet    ELAVIL    30 tablet    Take 1 tablet (25 mg) by mouth At Bedtime    Migraine without status migrainosus, not intractable, unspecified migraine type       aspirin 162 MG EC tablet      Take 162 mg by mouth daily        celecoxib 200 MG capsule    celeBREX    21 capsule    Take 1 capsule (200 mg) by mouth daily for 21 days    Hip pain, right       cephALEXin 500 MG capsule    KEFLEX    20 capsule    Take 1 capsule (500 mg) by mouth 2 times daily    Acute serous otitis media of left ear, recurrence not specified       hydrOXYzine 25 MG  capsule    VISTARIL          methylPREDNISolone 4 MG tablet    MEDROL DOSEPAK    21 tablet    Follow package instructions    Meniere's disease, unspecified laterality       MOTRIN IB PO           MUCINEX 600 MG 12 hr tablet   Generic drug:  guaiFENesin      Take 1,200 mg by mouth 2 times daily        oxyCODONE-acetaminophen 5-325 MG per tablet    PERCOCET    60 tablet    Take 1-2 tablets by mouth every 6 hours as needed for moderate to severe pain    Hip pain, right       predniSONE 20 MG tablet    DELTASONE    5 tablet    Take 1 tablet (20 mg) by mouth daily    History of Meniere's disease, Acute serous otitis media of left ear, recurrence not specified, Nasal sinus congestion       REGLAN PO      Take 10 mg by mouth        RELPAX 40 MG tablet   Generic drug:  eletriptan      Take 40 mg by mouth at onset of headache for migraine As needed        scopolamine 72 hr patch    TRANSDERM     Place 1 patch onto the skin every 72 hours Reported on 3/31/2017        SUDAFED PO           TYLENOL PO           * ZOFRAN PO           * ondansetron 4 MG tablet    ZOFRAN    18 tablet    Take 1 tablet (4 mg) by mouth every 8 hours as needed for nausea    History of Meniere's disease       * Notice:  This list has 2 medication(s) that are the same as other medications prescribed for you. Read the directions carefully, and ask your doctor or other care provider to review them with you.

## 2017-11-01 NOTE — NURSING NOTE
"Chief Complaint   Patient presents with     Consult     Former [facility] pt     Ear Problem     Ménière disease        Initial Pulse 85  Resp 18  SpO2 99% Estimated body mass index is 35.16 kg/(m^2) as calculated from the following:    Height as of 10/3/16: 1.651 m (5' 5\").    Weight as of 10/3/16: 95.8 kg (211 lb 4.8 oz).  Medication Reconciliation: complete  "

## 2017-11-01 NOTE — PROGRESS NOTES
ENT Consultation    Liliana Poole is a 47 year old female who is seen in consultation at the request of former [facility] pt.       History of Present Illness - Liliana Poole is a 47 year old female here today for Meniere's disease. Has had several episodes of pain and pressure in the left ear. She has recently been on a course of prednisone from the [facility] that did help for a few days with pain, but shortly became infective. Patient has a history of severe migraines. She has been on dieretics in the past that trigger her migraines. She has also tried Verapamil that presented with a rash.    Past Medical History -   Past Medical History:   Diagnosis Date     GERD (gastroesophageal reflux disease)      Hip pain      History of Helicobacter pylori infection      Hyperlipidaemia      Hypertension      Insomnia      Meniere's disease      Migraines      Motion sickness      Obese      Patent foramen ovale      PFO (patent foramen ovale)     SUKHWINDER 8/18/2010,echo 6/6/2008     PONV (postoperative nausea and vomiting)        Current Medications -   Current Outpatient Prescriptions:      hydrOXYzine (VISTARIL) 25 MG capsule, , Disp: , Rfl:      ondansetron (ZOFRAN) 4 MG tablet, Take 1 tablet (4 mg) by mouth every 8 hours as needed for nausea, Disp: 18 tablet, Rfl: 0     predniSONE (DELTASONE) 20 MG tablet, Take 1 tablet (20 mg) by mouth daily, Disp: 5 tablet, Rfl: 0     cephALEXin (KEFLEX) 500 MG capsule, Take 1 capsule (500 mg) by mouth 2 times daily, Disp: 20 capsule, Rfl: 0     methylPREDNISolone (MEDROL DOSEPAK) 4 MG tablet, Follow package instructions, Disp: 21 tablet, Rfl: 0     guaiFENesin (MUCINEX) 600 MG 12 hr tablet, Take 1,200 mg by mouth 2 times daily, Disp: , Rfl:      Acetaminophen (TYLENOL PO), , Disp: , Rfl:      Pseudoephedrine HCl (SUDAFED PO), , Disp: , Rfl:      MOTRIN IB PO, , Disp: , Rfl:      Ondansetron HCl (ZOFRAN PO), , Disp: , Rfl:      Metoclopramide HCl (REGLAN PO), Take 10 mg  by mouth, Disp: , Rfl:      scopolamine (TRANSDERM) (1.5mg base/3day) patch, Place 1 patch onto the skin every 72 hours Reported on 3/31/2017, Disp: , Rfl:      oxyCODONE-acetaminophen (PERCOCET) 5-325 MG per tablet, Take 1-2 tablets by mouth every 6 hours as needed for moderate to severe pain (Patient not taking: Reported on 3/31/2017), Disp: 60 tablet, Rfl: 0     celecoxib (CELEBREX) 200 MG capsule, Take 1 capsule (200 mg) by mouth daily for 21 days, Disp: 21 capsule, Rfl: 0     aspirin 162 MG EC tablet, Take 162 mg by mouth daily, Disp: , Rfl:      eletriptan (RELPAX) 40 MG tablet, Take 40 mg by mouth at onset of headache for migraine As needed, Disp: , Rfl:     Allergies -   Allergies   Allergen Reactions     Atorvastatin      Muscle aches     Contrast Dye Hives     Crestor [Rosuvastatin]      Muscle aches     Pravastatin      Muscle aches     Simvastatin      Muscle aches     Sulfa Drugs      Augmented Betamethasone Diprop [Betamethasone] Rash     Augmentin Rash     Verapamil Rash       Social History -   Social History     Social History     Marital status: Single     Spouse name: N/A     Number of children: N/A     Years of education: N/A     Social History Main Topics     Smoking status: Never Smoker     Smokeless tobacco: Not on file     Alcohol use Yes     Drug use: No     Sexual activity: Not on file     Other Topics Concern     Exercise Yes     treadmill, weights, kettlebells     Social History Narrative       Family History -   Family History   Problem Relation Age of Onset     Hypertension Mother      Hypertension Father      C.A.D. Father      CABG in 60s       Review of Systems - As per HPI and PMHx, otherwise review of system review of the head and neck negative.    Physical Exam  There were no vitals taken for this visit.  BMI: There is no height or weight on file to calculate BMI.    General - The patient is well nourished and well developed, and appears to have good nutritional status.  Alert and  oriented to person and place, answers questions and cooperates with examination appropriately.    SKIN - No suspicious lesions or rashes.  Respiration - No respiratory distress.     Head and Face - Normocephalic and atraumatic, with no gross asymmetry noted of the contour of the facial features.  The facial nerve is intact, with strong symmetric movements.    Voice and Breathing - The patient was breathing comfortably without the use of accessory muscles. There was no wheezing, stridor, or stertor.  The patients voice was clear and strong, and had appropriate pitch and quality.    Ears - Bilateral pinna and EACs with normal appearing overlying skin. Tympanic membrane intact with good mobility on pneumatic otoscopy bilaterally. Bony landmarks of the ossicular chain are normal. The tympanic membranes are normal in appearance. No retraction, perforation, or masses.  No fluid or purulence was seen in the external canal or the middle ear.     Eyes - Extraocular movements intact.  Sclera were not icteric or injected, conjunctiva were pink and moist.    Mouth - Examination of the oral cavity showed pink, healthy oral mucosa. No lesions or ulcerations noted.  The tongue was mobile and midline, and the dentition were in good condition.      Throat - The walls of the oropharynx were smooth, pink, moist, symmetric, and had no lesions or ulcerations.  The tonsillar pillars and soft palate were symmetric.  The uvula was midline on elevation.    Neck - Normal midline excursion of the laryngotracheal complex during swallowing.  Full range of motion on passive movement.  Palpation of the occipital, submental, submandibular, internal jugular chain, and supraclavicular nodes did not demonstrate any abnormal lymph nodes or masses.  The carotid pulse was palpable bilaterally.  Palpation of the thyroid was soft and smooth, with no nodules or goiter appreciated.  The trachea was mobile and midline. SCM tenderness.    Nose - External  contour is symmetric, no gross deflection or scars.  Nasal mucosa is pink and moist with no abnormal mucus.  The septum was midline and non-obstructive, turbinates of normal size and position.  No polyps, masses, or purulence noted on examination.    Neuro - Nonfocal neuro exam is normal, CN 2 through 12 intact, normal gait and muscle tone. Rombreg with eyes closed with unsteadiness and a backwards fall.        Performed in clinic today:  Audiologic Studies - An audiogram and tympanogram were performed today as part of the evaluation and personally reviewed. The tympanogram shows normal Type A curves, with normal canal volumes and middle ear pressures.  There is no sign of eustachian tube dysfunction or middle ear effusion.  The audiogram was also normal.  Patient had 100% word recognition. Patient exhibits mild borderline hearing loss in the right ear and mild hearing loss in the left ear.           A/P - Liliana Poole is a 47 year old female with previous diagnosis of Meniere's left side but now also significant elements of Vestibular Migraine. WE  her on this disorder and answer many questions. Plan therapy with Amytriptalline instead of Calcium channel blockers to which she had rash reaction and also has low BP.  WE spent 45 min with the patient more than half in counseling. Even steroid ME perfusion is discussed as future option.          This document serves as a record of the services and decisions personally performed and made by Dr. Julian Ha MD. It was created on his behalf by Ewelina Fritz, a trained medical scribe. The creation of this document is based the provider's statements to the medical scribe.  Ewelina Fritz 10:23 AM 11/1/2017    Provider:   The information in this document, created by the medical scribe for me, accurately reflects the services I personally performed and the decisions made by me. I have reviewed and approved this document for accuracy prior to leaving the  patient care area.  Dr. Julian Ha MD 10:23 AM 11/1/2017    Julian Ha MD

## 2017-11-01 NOTE — MR AVS SNAPSHOT
"              After Visit Summary   11/1/2017    Liliana Poole    MRN: [id number]           Patient Information     Date Of Birth          1970        Visit Information        Provider Department      11/1/2017 10:00 AM Julian Ha MD [facility]        Today's Diagnoses     Meniere's disease, bilateral    -  1    Mixed conductive and sensorineural hearing loss of both ears        Tenderness of neck        Migraine without status migrainosus, not intractable, unspecified migraine type           Follow-ups after your visit        Additional Services     AUDIOLOGY ADULT REFERRAL           PHYSICAL THERAPY REFERRAL       *This therapy referral will be filtered to a centralized scheduling office at [facility] and the patient will receive a call to schedule an appointment at a [place] location most convenient for them. *     [facility] provides Physical Therapy evaluation and treatment and many specialty services across the [place] system.  If requesting a specialty program, please choose from the list below.    If you have not heard from the scheduling office within 2 business days, please call [phone] for all locations, with the exception of [place], please call [phone].  Treatment: Evaluation & Treatment  Special Instructions/Modalities: none  Special Programs: none    Please be aware that coverage of these services is subject to the terms and limitations of your health insurance plan.  Call member services at your health plan with any benefit or coverage questions.      **Note to Provider:  If you are referring outside of [place] for the therapy appointment, please list the name of the location in the \"special instructions\" above, print the referral and give to the patient to schedule the appointment.                  Who to contact     If you have questions or need follow up information about today's clinic visit or your " "schedule please contact [facility] [place] directly at [phone].  Normal or non-critical lab and imaging results will be communicated to you by MyChart, letter or phone within 4 business days after the clinic has received the results. If you do not hear from us within 7 days, please contact the clinic through MyChart or phone. If you have a critical or abnormal lab result, we will notify you by phone as soon as possible.  Submit refill requests through dotHIV or call your pharmacy and they will forward the refill request to us. Please allow 3 business days for your refill to be completed.          Additional Information About Your Visit        [employer] Information     dotHIV lets you send messages to your doctor, view your test results, renew your prescriptions, schedule appointments and more. To sign up, go to [email or web address] . Click on \"Log in\" on the left side of the screen, which will take you to the Welcome page. Then click on \"Sign up Now\" on the right side of the page.     You will be asked to enter the access code listed below, as well as some personal information. Please follow the directions to create your username and password.     Your access code is: [email or web address]  Expires: 2017  2:02 PM     Your access code will  in 90 days. If you need help or a new code, please call your [place] clinic or [phone].        Care EveryWhere ID     This is your Care EveryWhere ID. This could be used by other organizations to access your [place] medical records  [id number]        Your Vitals Were     Pulse Respirations Pulse Oximetry             85 18 99%          Blood Pressure from Last 3 Encounters:   10/02/17 133/79   17 124/82   10/03/16 124/73    Weight from Last 3 Encounters:   10/03/16 95.8 kg (211 lb 4.8 oz)   16 91.6 kg (202 lb)   14 92.1 kg (203 lb)              We Performed the Following     AUDIOLOGY ADULT REFERRAL     PHYSICAL THERAPY REFERRAL        "   Today's Medication Changes          These changes are accurate as of: 11/1/17 10:45 AM.  If you have any questions, ask your nurse or doctor.               Start taking these medicines.        Dose/Directions    amitriptyline 25 MG tablet   Commonly known as:  ELAVIL   Used for:  Migraine without status migrainosus, not intractable, unspecified migraine type   Started by:  Julian Ha MD        Dose:  25 mg   Take 1 tablet (25 mg) by mouth At Bedtime   Quantity:  30 tablet   Refills:  1            Where to get your medicines      These medications were sent to [facility] [address]  [address] [facility] [address] [address]     Phone:  [phone]     amitriptyline 25 MG tablet                Primary Care Provider Office Phone # Fax #    Francisca Alves [phone]       [facility] [address] [facility] [facility] [address]        Equal Access to Services     [facility] CenterKIMBERLY : Hadii nila ku hadasho Soomaali, waaxda luqadaha, qaybta kaalmada adeegyada, trevor triplettin hayrodrick best . So [facility] [phone].    ATENCIÓN: Si habla español, tiene a hunter disposición servicios gratuitos de asistencia lingüística. [facility] [phone].    We comply with applicable federal civil rights laws and [place] laws. We do not discriminate on the basis of race, color, national origin, age, disability, sex, sexual orientation, or gender identity.            Thank you!     Thank you for choosing [facility]  for your care. Our goal is always to provide you with excellent care. Hearing back from our patients is one way we can continue to improve our services. Please take a few minutes to complete the written survey that you may receive in the mail after your visit with us. Thank you!             Your Updated Medication List - Protect others around you: Learn how to safely use, store and throw away your medicines at  www.disposemymeds.org.          This list is accurate as of: 11/1/17 10:45 AM.  Always use your most recent med list.                   Brand Name Dispense Instructions for use Diagnosis    amitriptyline 25 MG tablet    ELAVIL    30 tablet    Take 1 tablet (25 mg) by mouth At Bedtime    Migraine without status migrainosus, not intractable, unspecified migraine type       aspirin 162 MG EC tablet      Take 162 mg by mouth daily        celecoxib 200 MG capsule    celeBREX    21 capsule    Take 1 capsule (200 mg) by mouth daily for 21 days    Hip pain, right       cephALEXin 500 MG capsule    KEFLEX    20 capsule    Take 1 capsule (500 mg) by mouth 2 times daily    Acute serous otitis media of left ear, recurrence not specified       hydrOXYzine 25 MG capsule    VISTARIL          methylPREDNISolone 4 MG tablet    MEDROL DOSEPAK    21 tablet    Follow package instructions    Meniere's disease, unspecified laterality       MOTRIN IB PO           MUCINEX 600 MG 12 hr tablet   Generic drug:  guaiFENesin      Take 1,200 mg by mouth 2 times daily        oxyCODONE-acetaminophen 5-325 MG per tablet    PERCOCET    60 tablet    Take 1-2 tablets by mouth every 6 hours as needed for moderate to severe pain    Hip pain, right       predniSONE 20 MG tablet    DELTASONE    5 tablet    Take 1 tablet (20 mg) by mouth daily    History of Meniere's disease, Acute serous otitis media of left ear, recurrence not specified, Nasal sinus congestion       REGLAN PO      Take 10 mg by mouth        RELPAX 40 MG tablet   Generic drug:  eletriptan      Take 40 mg by mouth at onset of headache for migraine As needed        scopolamine 72 hr patch    TRANSDERM     Place 1 patch onto the skin every 72 hours Reported on 3/31/2017        SUDAFED PO           TYLENOL PO           * ZOFRAN PO           * ondansetron 4 MG tablet    ZOFRAN    18 tablet    Take 1 tablet (4 mg) by mouth every 8 hours as needed for nausea    History of Meniere's disease        * Notice:  This list has 2 medication(s) that are the same as other medications prescribed for you. Read the directions carefully, and ask your doctor or other care provider to review them with you.

## 2017-11-01 NOTE — PROGRESS NOTES
AUDIOLOGY REPORT: HEARING EXAM    SUBJECTIVE:  Liliana Poole is a 47 year old female referred to audiology from ENT by Dr. Ha for a hearing examination. Patient reports history of Menière's disease with hearing loss of both ears (worse in left ear) and bilateral tinnitus (constant ringing, worse in left ear) for over 10 years. Patient also reports history of vertigo (room spinning), but recent episodes have resulted in intermittent imbalance persisting 1-2 days following gradual increase of aural fullness in the left ear.    OBJECTIVE:    Otoscopy:   RIGHT: clear ear canal   LEFT:  clear ear canal    Tympanometry:   RIGHT:  normal eardrum mobility   LEFT:   normal eardrum mobility    Thresholds:   Pure Tone Thresholds assessed using conventional audiometry with good  reliability from 250-8000 Hz bilaterally using circumaural headphones.   RIGHT:  slight sensorineural hearing loss   LEFT:   mild sensorineural hearing loss    Speech Reception Threshold:   RIGHT:  20 dB HL   LEFT:    30 dB HL    Word Recognition Score:    RIGHT:  100% at 60 dB HL using NU-6 recorded word list   LEFT:    100% at 70 dB HL using NU-6 recorded word list    ASSESSMENT:  Asymmetric sensorineural hearing loss and tinnitus of both ears    Discussed results with the patient.     PLAN:  Patient was returned to ENT for follow up.     Юлия Oconnell.  Licensed Audiologist, [place] #[id number]  [facility]  11/1/2017

## 2017-11-08 ENCOUNTER — THERAPY VISIT (OUTPATIENT)
Dept: PHYSICAL THERAPY | Facility: CLINIC | Age: 47
End: 2017-11-08
Payer: COMMERCIAL

## 2017-11-08 DIAGNOSIS — M54.2 CERVICAL PAIN: Primary | ICD-10-CM

## 2017-11-08 PROCEDURE — 97161 PT EVAL LOW COMPLEX 20 MIN: CPT | Mod: GP | Performed by: PHYSICAL THERAPIST

## 2017-11-08 PROCEDURE — 97110 THERAPEUTIC EXERCISES: CPT | Mod: GP | Performed by: PHYSICAL THERAPIST

## 2017-11-08 NOTE — MR AVS SNAPSHOT
"              After Visit Summary   11/8/2017    Liliana Poole    MRN: [id number]           Patient Information     Date Of Birth          1970        Visit Information        Provider Department      11/8/2017 11:20 AM Kayode Varela PT [facility] Physical Therapy        Today's Diagnoses     Cervical pain    -  1       Follow-ups after your visit        Your next 10 appointments already scheduled     Nov 14, 2017 12:50 PM CST   [facility] with Kayode Varela PT   [facility]    [address]  [facility] [id number] [phone]            Dec 06, 2017 10:30 AM CST   [facility] MD   [facility]    [address] [place] [address]  [address]  [facility] [id number] [phone]              Who to contact     If you have questions or need follow up information about today's clinic visit or your schedule please contact [facility] directly at [phone].  Normal or non-critical lab and imaging results will be communicated to you by [employer], letter or phone within 4 business days after the clinic has received the results. If you do not hear from us within 7 days, please contact the clinic through [employer] or phone. If you have a critical or abnormal lab result, we will notify you by phone as soon as possible.  Submit refill requests through [email or web address] or call your pharmacy and they will forward the refill request to us. Please allow 3 business days for your refill to be completed.          Additional Information About Your Visit        MyChart Information     [email or web address] lets you send messages to your doctor, view your test results, renew your prescriptions, schedule appointments and more. To sign up, go to [email or web address] . Click on \"Log in\" on the left side of the screen, which will " "take you to the Welcome page. Then click on \"Sign up Now\" on the right side of the page.     You will be asked to enter the access code listed below, as well as some personal information. Please follow the directions to create your username and password.     Your access code is: [email or web address]  Expires: 2017  1:02 PM     Your access code will  in 90 days. If you need help or a new code, please call your [place] clinic or [phone].        Care EveryWhere ID     This is your Care EveryWhere ID. This could be used by other organizations to access your [place] medical records  [id number]         Blood Pressure from Last 3 Encounters:   10/02/17 133/79   17 124/82   10/03/16 124/73    Weight from Last 3 Encounters:   10/03/16 95.8 kg (211 lb 4.8 oz)   16 91.6 kg (202 lb)   14 92.1 kg (203 lb)              We Performed the Following     HC PT EVAL, LOW COMPLEXITY     [facility] INITIAL EVAL REPORT     THERAPEUTIC EXERCISES        Primary Care Provider Office Phone # Fax #    Francisca Long [phone]       [address] [address]        Equal Access to Services     DAVID NUNEZ : Hadii aad ku hadasho Soomaali, waaxda luqadaha, qaybta kaalmada adeegyada, waxay uzielin hayephraimn manuel best . So [facility] [phone].    ATENCIÓN: Si habla español, tiene a hunter disposición servicios gratuitos de asistencia lingüística. [facility] [phone].    We comply with applicable federal civil rights laws and [place] laws. We do not discriminate on the basis of race, color, national origin, age, disability, sex, sexual orientation, or gender identity.            Thank you!     Thank you for choosing [facility] PHYSICAL THERAPY  for your care. Our goal is always to provide you with excellent care. Hearing back from our patients is one way we can continue to improve our services. Please take a few minutes to complete the " written survey that you may receive in the mail after your visit with us. Thank you!             Your Updated Medication List - Protect others around you: Learn how to safely use, store and throw away your medicines at www.disposemymeds.org.          This list is accurate as of: 11/8/17 11:58 AM.  Always use your most recent med list.                   Brand Name Dispense Instructions for use Diagnosis    amitriptyline 25 MG tablet    ELAVIL    30 tablet    Take 1 tablet (25 mg) by mouth At Bedtime    Migraine without status migrainosus, not intractable, unspecified migraine type       aspirin 162 MG EC tablet      Take 162 mg by mouth daily        celecoxib 200 MG capsule    celeBREX    21 capsule    Take 1 capsule (200 mg) by mouth daily for 21 days    Hip pain, right       cephALEXin 500 MG capsule    KEFLEX    20 capsule    Take 1 capsule (500 mg) by mouth 2 times daily    Acute serous otitis media of left ear, recurrence not specified       hydrOXYzine 25 MG capsule    VISTARIL          methylPREDNISolone 4 MG tablet    MEDROL DOSEPAK    21 tablet    Follow package instructions    Meniere's disease, unspecified laterality       MOTRIN IB PO           MUCINEX 600 MG 12 hr tablet   Generic drug:  guaiFENesin      Take 1,200 mg by mouth 2 times daily        oxyCODONE-acetaminophen 5-325 MG per tablet    PERCOCET    60 tablet    Take 1-2 tablets by mouth every 6 hours as needed for moderate to severe pain    Hip pain, right       predniSONE 20 MG tablet    DELTASONE    5 tablet    Take 1 tablet (20 mg) by mouth daily    History of Meniere's disease, Acute serous otitis media of left ear, recurrence not specified, Nasal sinus congestion       REGLAN PO      Take 10 mg by mouth        RELPAX 40 MG tablet   Generic drug:  eletriptan      Take 40 mg by mouth at onset of headache for migraine As needed        scopolamine 72 hr patch    TRANSDERM     Place 1 patch onto the skin every 72 hours Reported on 3/31/2017         SUDAFED PO           TYLENOL PO           * ZOFRAN PO           * ondansetron 4 MG tablet    ZOFRAN    18 tablet    Take 1 tablet (4 mg) by mouth every 8 hours as needed for nausea    History of Meniere's disease       * Notice:  This list has 2 medication(s) that are the same as other medications prescribed for you. Read the directions carefully, and ask your doctor or other care provider to review them with you.

## 2017-11-08 NOTE — LETTER
[facility] ATHLETIC [facility] PHYSICAL [facility]  [address]  [facility] [id number] [phone]    2017    Re: Liliana Poole   :   1970  MRN:  [id number]   REFERRING PHYSICIAN:   Julian Ha    [facility]    Date of Initial Evaluation:  ***  Visits:  Rxs Used: 1  Reason for Referral:  Cervical pain    EVALUATION SUMMARY    [facility]      Subjective:    Patient is a 47 year old female presenting with rehab cervical spine hpi. The history is provided by the patient. No  was used.   Liliana Poole is a 47 year old female with a cervical spine (dizziness) condition.  Condition occurred with:  Degenerative joint disease.  Condition occurred: for unknown reasons.  This is a chronic condition  Pt presents to PT with primary complaint of dizziness, cervical pain. She started with ongoing intermittent flares of dizziness. 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 She has cervical pain on the L side and centrally, states she has had degenerative changes in her neck as well. Pain radiates down the lateral L cervical spine to the clavicle. Her neck pain is rated at 1-2/10 level at this time. Pt followed up with physician on 17 and was referred to PT for cervical pain, migraines. .    Patient reports pain:  Cervical left side, central cervical spine, upper cervical spine, mid cervical spine and lower cervical spine.  Radiates to:  Head.  Pain is described as aching and is constant and reported as 2/10.  Associated symptoms:  Loss of motion/stiffness and headache. Pain is the same all the time.  Symptoms are exacerbated by sitting and rotating head (Posture) and relieved by NSAID's and rest.  Since onset symptoms are unchanged.        General health as reported by patient is good.  Pertinent medical history  "includes:  Osteoarthritis and migraines.  Medical allergies: yes (See EPIC).  Other surgeries include:  Orthopedic surgery.    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  Primary job tasks include:  Prolonged standing, lifting and repetitive tasks.    Barriers include:  None as reported by the patient.    Red flags:  None as reported by the patient.                        Objective:    Standing Alignment:    Cervical/Thoracic:  Forward head  Shoulder/UE:  Rounded shoulders                                  Cervical/Thoracic Evaluation    AROM:  AROM Cervical:    Flexion:            Chin 1\" to sternum, pulling  Extension:       68 deg, pain base of skull  Rotation:         Left: 60 deg, pulling crepitus      Right: 75 deg, pulling, crepitus   Side Bend:      Left: 16 deg, painful      Right:  22 deg, painful       Headaches: migraine  Cervical Myotomes:  normal                  DTR's:  not assessed          Cervical Dermatomes:  normal                    Cervical Palpation:    Tenderness present at Left:    Sternocleidomstoid; Scalenes; Rhomboids; Upper Trap; Levator; Erector Spinae; Facet and Suboccipitals  Tenderness present at Right:    Sternocleidomstoid; Scalenes; Rhomboids; Upper Trap; Levator; Erector Spinae; Facet and Suboccipitals  Functional Tests:  Core strength and proprioception spine wnl: Single leg balance eyes open 60 secods each, eyes closed 2-3 seconds each eye.    Cervical Stability/Joint Clearing:      Left negative at: TLA LAT or TOS Screen    Right negative at:  TLA LAT or TOS Screen  Negative:ALAR Ligament and TLA AP  Spinal Segmental Conclusions:    Level:  Hypo at C3, C4 and C5                                                General     ROS    Assessment/Plan:      Patient is a 47 year old female with cervical complaints.    Patient has the following significant findings with corresponding treatment plan.                Diagnosis 1:  Cervical pain, " dizziness   Pain -  manual therapy, self management, education, directional preference exercise and home program  Decreased ROM/flexibility - manual therapy, therapeutic exercise and home program  Decreased joint mobility - manual therapy, therapeutic exercise and home program  Inflammation - self management/home program  Impaired muscle performance - neuro re-education and home program  Decreased function - therapeutic activities and home program  Impaired posture - neuro re-education and home program    Therapy Evaluation Codes:   1) History comprised of:   Personal factors that impact the plan of care:      None.    Comorbidity factors that impact the plan of care are:      None.     Medications impacting care: None.  2) Examination of Body Systems comprised of:   Body structures and functions that impact the plan of care:      Cervical spine.   Activity limitations that impact the plan of care are:      Driving, Lifting and Sleeping.  3) Clinical presentation characteristics are:   Stable/Uncomplicated.  4) Decision-Making    Low complexity using standardized patient assessment instrument and/or measureable assessment of functional outcome.  Cumulative Therapy Evaluation is: Low complexity.    Previous and current functional limitations:  (See Goal Flow Sheet for this information)    Short term and Long term goals: (See Goal Flow Sheet for this information)     Communication ability:  Patient appears to be able to clearly communicate and understand verbal and written communication and follow directions correctly.  Treatment Explanation - The following has been discussed with the patient:   RX ordered/plan of care  Anticipated outcomes  Possible risks and side effects  This patient would benefit from PT intervention to resume normal activities.   Rehab potential is good.    Frequency:  2 X week, once daily  Duration:  for 2 weeks tapering to 1 X a week over 6 weeks  Discharge Plan:  Achieve all LTG.  Independent  in home treatment program.  Reach maximal therapeutic benefit.      Thank you for your referral.    INQUIRIES  Therapist:   [facility]  [address]  [facility] [address]  Phone: [phone]  Fax: [phone]

## 2017-11-08 NOTE — PROGRESS NOTES
[facility] Initial Evaluation      Subjective:    Patient is a 47 year old female presenting with rehab cervical spine hpi. The history is provided by the patient. No  was used.   Liliana Poole is a 47 year old female with a cervical spine (dizziness) condition.  Condition occurred with:  Degenerative joint disease.  Condition occurred: for unknown reasons.  This is a chronic condition  Pt presents to PT with primary complaint of dizziness, cervical pain. She started with ongoing intermittent flares of dizziness. 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 She has cervical pain on the L side and centrally, states she has had degenerative changes in her neck as well. Pain radiates down the lateral L cervical spine to the clavicle. Her neck pain is rated at 1-2/10 level at this time. Pt followed up with physician on 11-1-17 and was referred to PT for cervical pain, migraines. .    Patient reports pain:  Cervical left side, central cervical spine, upper cervical spine, mid cervical spine and lower cervical spine.  Radiates to:  Head.  Pain is described as aching and is constant and reported as 2/10.  Associated symptoms:  Loss of motion/stiffness and headache. Pain is the same all the time.  Symptoms are exacerbated by sitting and rotating head (Posture) and relieved by NSAID's and rest.  Since onset symptoms are unchanged.        General health as reported by patient is good.  Pertinent medical history includes:  Osteoarthritis and migraines.  Medical allergies: yes (See EPIC).  Other surgeries include:  Orthopedic surgery.    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  Primary job tasks include:  Prolonged standing, lifting and repetitive tasks.    Barriers include:  None as reported by the patient.    Red flags:  None as reported by the patient.                        Objective:    Standing  "Alignment:    Cervical/Thoracic:  Forward head  Shoulder/UE:  Rounded shoulders                                  Cervical/Thoracic Evaluation    AROM:  AROM Cervical:    Flexion:            Chin 1\" to sternum, pulling  Extension:       68 deg, pain base of skull  Rotation:         Left: 60 deg, pulling crepitus      Right: 75 deg, pulling, crepitus   Side Bend:      Left: 16 deg, painful      Right:  22 deg, painful       Headaches: migraine  Cervical Myotomes:  normal                  DTR's:  not assessed          Cervical Dermatomes:  normal                    Cervical Palpation:    Tenderness present at Left:    Sternocleidomstoid; Scalenes; Rhomboids; Upper Trap; Levator; Erector Spinae; Facet and Suboccipitals  Tenderness present at Right:    Sternocleidomstoid; Scalenes; Rhomboids; Upper Trap; Levator; Erector Spinae; Facet and Suboccipitals  Functional Tests:  Core strength and proprioception spine wnl: Single leg balance eyes open 60 secods each, eyes closed 2-3 seconds each eye.    Cervical Stability/Joint Clearing:      Left negative at: TLA LAT or TOS Screen    Right negative at:  TLA LAT or TOS Screen  Negative:ALAR Ligament and TLA AP  Spinal Segmental Conclusions:    Level:  Hypo at C3, C4 and C5                                                General     ROS    Assessment/Plan:      Patient is a 47 year old female with cervical complaints.    Patient has the following significant findings with corresponding treatment plan.                Diagnosis 1:  Cervical pain, dizziness   Pain -  manual therapy, self management, education, directional preference exercise and home program  Decreased ROM/flexibility - manual therapy, therapeutic exercise and home program  Decreased joint mobility - manual therapy, therapeutic exercise and home program  Inflammation - self management/home program  Impaired muscle performance - neuro re-education and home program  Decreased function - therapeutic activities and home " program  Impaired posture - neuro re-education and home program    Therapy Evaluation Codes:   1) History comprised of:   Personal factors that impact the plan of care:      None.    Comorbidity factors that impact the plan of care are:      None.     Medications impacting care: None.  2) Examination of Body Systems comprised of:   Body structures and functions that impact the plan of care:      Cervical spine.   Activity limitations that impact the plan of care are:      Driving, Lifting and Sleeping.  3) Clinical presentation characteristics are:   Stable/Uncomplicated.  4) Decision-Making    Low complexity using standardized patient assessment instrument and/or measureable assessment of functional outcome.  Cumulative Therapy Evaluation is: Low complexity.    Previous and current functional limitations:  (See Goal Flow Sheet for this information)    Short term and Long term goals: (See Goal Flow Sheet for this information)     Communication ability:  Patient appears to be able to clearly communicate and understand verbal and written communication and follow directions correctly.  Treatment Explanation - The following has been discussed with the patient:   RX ordered/plan of care  Anticipated outcomes  Possible risks and side effects  This patient would benefit from PT intervention to resume normal activities.   Rehab potential is good.    Frequency:  2 X week, once daily  Duration:  for 2 weeks tapering to 1 X a week over 6 weeks  Discharge Plan:  Achieve all LTG.  Independent in home treatment program.  Reach maximal therapeutic benefit.    Please refer to the daily flowsheet for treatment today, total treatment time and time spent performing 1:1 timed codes.

## 2017-11-14 ENCOUNTER — THERAPY VISIT (OUTPATIENT)
Dept: PHYSICAL THERAPY | Facility: CLINIC | Age: 47
End: 2017-11-14
Payer: COMMERCIAL

## 2017-11-14 DIAGNOSIS — M54.2 CERVICAL PAIN: ICD-10-CM

## 2017-11-14 PROCEDURE — 97110 THERAPEUTIC EXERCISES: CPT | Mod: GP | Performed by: PHYSICAL THERAPIST

## 2017-11-14 PROCEDURE — 97112 NEUROMUSCULAR REEDUCATION: CPT | Mod: GP | Performed by: PHYSICAL THERAPIST

## 2017-11-14 PROCEDURE — 97140 MANUAL THERAPY 1/> REGIONS: CPT | Mod: GP | Performed by: PHYSICAL THERAPIST

## 2017-11-27 ENCOUNTER — THERAPY VISIT (OUTPATIENT)
Dept: PHYSICAL THERAPY | Facility: CLINIC | Age: 47
End: 2017-11-27
Payer: COMMERCIAL

## 2017-11-27 DIAGNOSIS — M54.2 CERVICAL PAIN: ICD-10-CM

## 2017-11-27 PROCEDURE — 97112 NEUROMUSCULAR REEDUCATION: CPT | Mod: GP | Performed by: PHYSICAL THERAPIST

## 2017-11-27 PROCEDURE — 97140 MANUAL THERAPY 1/> REGIONS: CPT | Mod: GP | Performed by: PHYSICAL THERAPIST

## 2017-11-27 PROCEDURE — 97110 THERAPEUTIC EXERCISES: CPT | Mod: GP | Performed by: PHYSICAL THERAPIST

## 2017-12-06 ENCOUNTER — OFFICE VISIT (OUTPATIENT)
Dept: OTOLARYNGOLOGY | Facility: OTHER | Age: 47
End: 2017-12-06
Payer: COMMERCIAL

## 2017-12-06 VITALS — HEART RATE: 74 BPM | RESPIRATION RATE: 18 BRPM | OXYGEN SATURATION: 99 %

## 2017-12-06 DIAGNOSIS — H81.03 MENIERE'S DISEASE, BILATERAL: ICD-10-CM

## 2017-12-06 DIAGNOSIS — G43.719 INTRACTABLE CHRONIC MIGRAINE WITHOUT AURA AND WITHOUT STATUS MIGRAINOSUS: Primary | ICD-10-CM

## 2017-12-06 PROCEDURE — 99213 OFFICE O/P EST LOW 20 MIN: CPT | Performed by: OTOLARYNGOLOGY

## 2017-12-06 NOTE — MR AVS SNAPSHOT
"              After Visit Summary   12/6/2017    Liliana Poole    MRN: [id number]           Patient Information     Date Of Birth          1970        Visit Information        Provider Department      12/6/2017 10:30 AM Julian Ha MD [facility]         Follow-ups after your visit        Your next 10 appointments already scheduled     Dec 11, 2017  2:30 PM CST   [facility] with Kayode Varela PT   [facility]  [facility]    [address]  [facility] [id number] [phone]              Who to contact     If you have questions or need follow up information about today's clinic visit or your schedule please contact [facility] directly at [phone].  Normal or non-critical lab and imaging results will be communicated to you by MyChart, letter or phone within 4 business days after the clinic has received the results. If you do not hear from us within 7 days, please contact the clinic through MyChart or phone. If you have a critical or abnormal lab result, we will notify you by phone as soon as possible.  Submit refill requests through [facility] or call your pharmacy and they will forward the refill request to us. Please allow 3 business days for your refill to be completed.          Additional Information About Your Visit        MyChart Information     [facility] lets you send messages to your doctor, view your test results, renew your prescriptions, schedule appointments and more. To sign up, go to [email or web address] . Click on \"Log in\" on the left side of the screen, which will take you to the Welcome page. Then click on \"Sign up Now\" on the right side of the page.     You will be asked to enter the access code listed below, as well as some personal information. Please follow the directions to create your username and password.     Your access code is: [email or web address]  Expires: 12/31/2017  1:02 PM   "   Your access code will  in 90 days. If you need help or a new code, please call your [place] clinic or [phone].        Care EveryWhere ID     This is your Care EveryWhere ID. This could be used by other organizations to access your [place] medical records  [id number]        Your Vitals Were     Pulse Respirations Pulse Oximetry             74 18 99%          Blood Pressure from Last 3 Encounters:   10/02/17 133/79   17 124/82   10/03/16 124/73    Weight from Last 3 Encounters:   10/03/16 95.8 kg (211 lb 4.8 oz)   16 91.6 kg (202 lb)   14 92.1 kg (203 lb)              Today, you had the following     No orders found for display       Primary Care Provider Office Phone # Fax #    Francisca Alves [phone]       [facility] [address] [place] [address]        Equal Access to Services     DAVID NUNEZ : Hadii nila ku hadasho Soomaali, waaxda luqadaha, qaybta kaalmada adeegyada, waxay uzielin hayrodrick best . So [facility] [phone].    ATENCIÓN: Si habla español, tiene a hunter disposición servicios gratuitos de asistencia lingüística. Llame al [phone].    We comply with applicable federal civil rights laws and [place] laws. We do not discriminate on the basis of race, color, national origin, age, disability, sex, sexual orientation, or gender identity.            Thank you!     Thank you for choosing [facility]  for your care. Our goal is always to provide you with excellent care. Hearing back from our patients is one way we can continue to improve our services. Please take a few minutes to complete the written survey that you may receive in the mail after your visit with us. Thank you!             Your Updated Medication List - Protect others around you: Learn how to safely use, store and throw away your medicines at www.disposemymeds.org.          This list is accurate as of: 17 11:17 AM.  Always use your most  recent med list.                   Brand Name Dispense Instructions for use Diagnosis    amitriptyline 25 MG tablet    ELAVIL    30 tablet    Take 1 tablet (25 mg) by mouth At Bedtime    Migraine without status migrainosus, not intractable, unspecified migraine type       aspirin 162 MG EC tablet      Take 162 mg by mouth daily        celecoxib 200 MG capsule    celeBREX    21 capsule    Take 1 capsule (200 mg) by mouth daily for 21 days    Hip pain, right       cephALEXin 500 MG capsule    KEFLEX    20 capsule    Take 1 capsule (500 mg) by mouth 2 times daily    Acute serous otitis media of left ear, recurrence not specified       hydrOXYzine 25 MG capsule    VISTARIL          methylPREDNISolone 4 MG tablet    MEDROL DOSEPAK    21 tablet    Follow package instructions    Meniere's disease, unspecified laterality       MOTRIN IB PO           MUCINEX 600 MG 12 hr tablet   Generic drug:  guaiFENesin      Take 1,200 mg by mouth 2 times daily        oxyCODONE-acetaminophen 5-325 MG per tablet    PERCOCET    60 tablet    Take 1-2 tablets by mouth every 6 hours as needed for moderate to severe pain    Hip pain, right       predniSONE 20 MG tablet    DELTASONE    5 tablet    Take 1 tablet (20 mg) by mouth daily    History of Meniere's disease, Acute serous otitis media of left ear, recurrence not specified, Nasal sinus congestion       REGLAN PO      Take 10 mg by mouth        RELPAX 40 MG tablet   Generic drug:  eletriptan      Take 40 mg by mouth at onset of headache for migraine As needed        scopolamine 72 hr patch    TRANSDERM     Place 1 patch onto the skin every 72 hours Reported on 3/31/2017        SUDAFED PO           TYLENOL PO           VITAMIN D-1000 MAX ST 1000 UNITS Tabs   Generic drug:  cholecalciferol      Take 1,000 Units by mouth        * ZOFRAN PO           * ondansetron 4 MG tablet    ZOFRAN    18 tablet    Take 1 tablet (4 mg) by mouth every 8 hours as needed for nausea    History of Meniere's  disease       * Notice:  This list has 2 medication(s) that are the same as other medications prescribed for you. Read the directions carefully, and ask your doctor or other care provider to review them with you.

## 2017-12-06 NOTE — PROGRESS NOTES
History of Present Illness - Liliana Poole is a 47 year old female presenting in clinic today for a recheck on meniere's and vestibular migraine. Patient reports that she was taking the amitriptyline, her neurologist then placed her on 100 mg of Neurontin. Patient in currently in physical therapy and it seems that it is helping. The pressure in her ears have diminished. She states that she still has tinnitus.       Past Medical History -   Past Medical History:   Diagnosis Date     GERD (gastroesophageal reflux disease)      Hip pain      History of Helicobacter pylori infection      Hyperlipidaemia      Hypertension      Insomnia      Meniere's disease      Migraines      Motion sickness      Obese      Patent foramen ovale      PFO (patent foramen ovale)     SUKHWINDER 8/18/2010,echo 6/6/2008     PONV (postoperative nausea and vomiting)        Current Medications -   Current Outpatient Prescriptions:      cholecalciferol (VITAMIN D-1000 MAX ST) 1000 UNITS TABS, Take 1,000 Units by mouth, Disp: , Rfl:      hydrOXYzine (VISTARIL) 25 MG capsule, , Disp: , Rfl:      ondansetron (ZOFRAN) 4 MG tablet, Take 1 tablet (4 mg) by mouth every 8 hours as needed for nausea, Disp: 18 tablet, Rfl: 0     predniSONE (DELTASONE) 20 MG tablet, Take 1 tablet (20 mg) by mouth daily, Disp: 5 tablet, Rfl: 0     cephALEXin (KEFLEX) 500 MG capsule, Take 1 capsule (500 mg) by mouth 2 times daily, Disp: 20 capsule, Rfl: 0     methylPREDNISolone (MEDROL DOSEPAK) 4 MG tablet, Follow package instructions, Disp: 21 tablet, Rfl: 0     guaiFENesin (MUCINEX) 600 MG 12 hr tablet, Take 1,200 mg by mouth 2 times daily, Disp: , Rfl:      Acetaminophen (TYLENOL PO), , Disp: , Rfl:      Pseudoephedrine HCl (SUDAFED PO), , Disp: , Rfl:      MOTRIN IB PO, , Disp: , Rfl:      Ondansetron HCl (ZOFRAN PO), , Disp: , Rfl:      Metoclopramide HCl (REGLAN PO), Take 10 mg by mouth, Disp: , Rfl:      scopolamine (TRANSDERM) (1.5mg base/3day) patch, Place 1 patch  onto the skin every 72 hours Reported on 3/31/2017, Disp: , Rfl:      oxyCODONE-acetaminophen (PERCOCET) 5-325 MG per tablet, Take 1-2 tablets by mouth every 6 hours as needed for moderate to severe pain, Disp: 60 tablet, Rfl: 0     aspirin 162 MG EC tablet, Take 162 mg by mouth daily, Disp: , Rfl:      eletriptan (RELPAX) 40 MG tablet, Take 40 mg by mouth at onset of headache for migraine As needed, Disp: , Rfl:      amitriptyline (ELAVIL) 25 MG tablet, Take 1 tablet (25 mg) by mouth At Bedtime, Disp: 30 tablet, Rfl: 1     celecoxib (CELEBREX) 200 MG capsule, Take 1 capsule (200 mg) by mouth daily for 21 days, Disp: 21 capsule, Rfl: 0    Allergies -   Allergies   Allergen Reactions     Atorvastatin      Muscle aches     Contrast Dye Hives     Crestor [Rosuvastatin]      Muscle aches     Pravastatin      Muscle aches     Simvastatin      Muscle aches     Sulfa Drugs      Augmented Betamethasone Diprop [Betamethasone] Rash     Augmentin Rash     Verapamil Rash       Social History -   Social History     Social History     Marital status: Single     Spouse name: N/A     Number of children: N/A     Years of education: N/A     Social History Main Topics     Smoking status: Never Smoker     Smokeless tobacco: Not on file     Alcohol use Yes     Drug use: No     Sexual activity: Not on file     Other Topics Concern     Exercise Yes     treadmill, weights, kettlebells     Social History Narrative       Family History -   Family History   Problem Relation Age of Onset     Hypertension Mother      Hypertension Father      C.A.D. Father      CABG in 60s       Review of Systems - As per HPI and PMHx, otherwise review of system review of the head and neck negative.    Physical Exam  Pulse 74  Resp 18  SpO2 99%  BMI: There is no height or weight on file to calculate BMI.    General - The patient is well nourished and well developed, and appears to have good nutritional status.  Alert and oriented to person and place, answers  questions and cooperates with examination appropriately.    SKIN - No suspicious lesions or rashes.  Respiration - No respiratory distress.  Head and Face - Normocephalic and atraumatic, with no gross asymmetry noted of the contour of the facial features.  The facial nerve is intact, with strong symmetric movements.    Voice and Breathing - The patient was breathing comfortably without the use of accessory muscles.  The patients voice was clear and strong, and had appropriate pitch and quality.    Ears - Bilateral pinna and EACs with normal appearing overlying skin. Tympanic membrane intact with good mobility on pneumatic otoscopy bilaterally. Bony landmarks of the ossicular chain are normal. The tympanic membranes are normal in appearance. No retraction, perforation, or masses.  No fluid or purulence was seen in the external canal or the middle ear.     Eyes - Extraocular movements intact.  Sclera were not icteric or injected, conjunctiva were pink and moist.    Mouth - Examination of the oral cavity showed pink, healthy oral mucosa. No lesions or ulcerations noted.  The tongue was mobile and midline, and the dentition were in good condition.      Throat - The walls of the oropharynx were smooth, pink, moist, symmetric, and had no lesions or ulcerations.  The tonsillar pillars and soft palate were symmetric. The uvula was midline on elevation.    Neck - Normal midline excursion of the laryngotracheal complex during swallowing.  Full range of motion on passive movement.  Palpation of the occipital, submental, submandibular, internal jugular chain, and supraclavicular nodes did not demonstrate any abnormal lymph nodes or masses.  The carotid pulse was palpable bilaterally.  Palpation of the thyroid was soft and smooth, with no nodules or goiter appreciated.  The trachea was mobile and midline.    Nose - External contour is symmetric, no gross deflection or scars.  Nasal mucosa is pink and moist with no abnormal  mucus.  The septum was midline and non-obstructive, turbinates of normal size and position.  No polyps, masses, or purulence noted on examination.    Neuro - Nonfocal neuro exam is normal, CN 2 through 12 intact, normal gait and muscle tone.      Performed in clinic today:  No procedures preformed in clinic today      A/P - Liliana Poole is a 47 year old female who presents to me today for a recheck on her ear problems. Patient will continues her neurontin as prescribed by her neurologist. I advised her to continue with physical therapy. When she begins to get URI symptoms I recommended with afrin and saline nasal spray.     Liliana should follow up in in 1 year.      At Liliana next appointment they will need a hearing test.      This document serves as a record of the services and decisions personally performed and made by Dr. Julian Ha MD. It was created on his behalf by Ewelina Fritz, a trained medical scribe. The creation of this document is based the provider's statements to the medical scribe.  Ewelina Fritz 12:58 PM 12/6/2017    Provider:   The information in this document, created by the medical scribe for me, accurately reflects the services I personally performed and the decisions made by me. I have reviewed and approved this document for accuracy prior to leaving the patient care area.  Dr. Julian Ha MD 12:58 PM 12/6/2017    Julian Ha MD

## 2017-12-06 NOTE — NURSING NOTE
"Chief Complaint   Patient presents with     RECHECK     Ear Problem     menieres and  Vestibular Migraine       Initial Pulse 74  Resp 18  SpO2 99% Estimated body mass index is 35.16 kg/(m^2) as calculated from the following:    Height as of 10/3/16: 1.651 m (5' 5\").    Weight as of 10/3/16: 95.8 kg (211 lb 4.8 oz).  Medication Reconciliation: complete  "

## 2017-12-06 NOTE — LETTER
12/6/2017         RE: Liliana Poole  [id number] [address] [place] [id number]        Dear Colleague,    Thank you for referring your patient, Liliana Poole, to the [facility]. Please see a copy of my visit note below.    History of Present Illness - Liliana Poole is a 47 year old female presenting in clinic today for a recheck on meniere's and vestibular migraine. Patient reports that she was taking the amitriptyline, her neurologist then placed her on 100 mg of Neurontin. Patient in currently in physical therapy and it seems that it is helping. The pressure in her ears have diminished. She states that she still has tinnitus.       Past Medical History -   Past Medical History:   Diagnosis Date     GERD (gastroesophageal reflux disease)      Hip pain      History of Helicobacter pylori infection      Hyperlipidaemia      Hypertension      Insomnia      Meniere's disease      Migraines      Motion sickness      Obese      Patent foramen ovale      PFO (patent foramen ovale)     SUKHWINDER 8/18/2010,echo 6/6/2008     PONV (postoperative nausea and vomiting)        Current Medications -   Current Outpatient Prescriptions:      cholecalciferol (VITAMIN D-1000 MAX ST) 1000 UNITS TABS, Take 1,000 Units by mouth, Disp: , Rfl:      hydrOXYzine (VISTARIL) 25 MG capsule, , Disp: , Rfl:      ondansetron (ZOFRAN) 4 MG tablet, Take 1 tablet (4 mg) by mouth every 8 hours as needed for nausea, Disp: 18 tablet, Rfl: 0     predniSONE (DELTASONE) 20 MG tablet, Take 1 tablet (20 mg) by mouth daily, Disp: 5 tablet, Rfl: 0     cephALEXin (KEFLEX) 500 MG capsule, Take 1 capsule (500 mg) by mouth 2 times daily, Disp: 20 capsule, Rfl: 0     methylPREDNISolone (MEDROL DOSEPAK) 4 MG tablet, Follow package instructions, Disp: 21 tablet, Rfl: 0     guaiFENesin (MUCINEX) 600 MG 12 hr tablet, Take 1,200 mg by mouth 2 times daily, Disp: , Rfl:      Acetaminophen (TYLENOL PO), , Disp: , Rfl:       Pseudoephedrine HCl (SUDAFED PO), , Disp: , Rfl:      MOTRIN IB PO, , Disp: , Rfl:      Ondansetron HCl (ZOFRAN PO), , Disp: , Rfl:      Metoclopramide HCl (REGLAN PO), Take 10 mg by mouth, Disp: , Rfl:      scopolamine (TRANSDERM) (1.5mg base/3day) patch, Place 1 patch onto the skin every 72 hours Reported on 3/31/2017, Disp: , Rfl:      oxyCODONE-acetaminophen (PERCOCET) 5-325 MG per tablet, Take 1-2 tablets by mouth every 6 hours as needed for moderate to severe pain, Disp: 60 tablet, Rfl: 0     aspirin 162 MG EC tablet, Take 162 mg by mouth daily, Disp: , Rfl:      eletriptan (RELPAX) 40 MG tablet, Take 40 mg by mouth at onset of headache for migraine As needed, Disp: , Rfl:      amitriptyline (ELAVIL) 25 MG tablet, Take 1 tablet (25 mg) by mouth At Bedtime, Disp: 30 tablet, Rfl: 1     celecoxib (CELEBREX) 200 MG capsule, Take 1 capsule (200 mg) by mouth daily for 21 days, Disp: 21 capsule, Rfl: 0    Allergies -   Allergies   Allergen Reactions     Atorvastatin      Muscle aches     Contrast Dye Hives     Crestor [Rosuvastatin]      Muscle aches     Pravastatin      Muscle aches     Simvastatin      Muscle aches     Sulfa Drugs      Augmented Betamethasone Diprop [Betamethasone] Rash     Augmentin Rash     Verapamil Rash       Social History -   Social History     Social History     Marital status: Single     Spouse name: N/A     Number of children: N/A     Years of education: N/A     Social History Main Topics     Smoking status: Never Smoker     Smokeless tobacco: Not on file     Alcohol use Yes     Drug use: No     Sexual activity: Not on file     Other Topics Concern     Exercise Yes     treadmill, weights, kettlebells     Social History Narrative       Family History -   Family History   Problem Relation Age of Onset     Hypertension Mother      Hypertension Father      C.A.D. Father      CABG in 60s       Review of Systems - As per HPI and PMHx, otherwise review of system review of the head and neck  negative.    Physical Exam  Pulse 74  Resp 18  SpO2 99%  BMI: There is no height or weight on file to calculate BMI.    General - The patient is well nourished and well developed, and appears to have good nutritional status.  Alert and oriented to person and place, answers questions and cooperates with examination appropriately.    SKIN - No suspicious lesions or rashes.  Respiration - No respiratory distress.  Head and Face - Normocephalic and atraumatic, with no gross asymmetry noted of the contour of the facial features.  The facial nerve is intact, with strong symmetric movements.    Voice and Breathing - The patient was breathing comfortably without the use of accessory muscles.  The patients voice was clear and strong, and had appropriate pitch and quality.    Ears - Bilateral pinna and EACs with normal appearing overlying skin. Tympanic membrane intact with good mobility on pneumatic otoscopy bilaterally. Bony landmarks of the ossicular chain are normal. The tympanic membranes are normal in appearance. No retraction, perforation, or masses.  No fluid or purulence was seen in the external canal or the middle ear.     Eyes - Extraocular movements intact.  Sclera were not icteric or injected, conjunctiva were pink and moist.    Mouth - Examination of the oral cavity showed pink, healthy oral mucosa. No lesions or ulcerations noted.  The tongue was mobile and midline, and the dentition were in good condition.      Throat - The walls of the oropharynx were smooth, pink, moist, symmetric, and had no lesions or ulcerations.  The tonsillar pillars and soft palate were symmetric. The uvula was midline on elevation.    Neck - Normal midline excursion of the laryngotracheal complex during swallowing.  Full range of motion on passive movement.  Palpation of the occipital, submental, submandibular, internal jugular chain, and supraclavicular nodes did not demonstrate any abnormal lymph nodes or masses.  The carotid  pulse was palpable bilaterally.  Palpation of the thyroid was soft and smooth, with no nodules or goiter appreciated.  The trachea was mobile and midline.    Nose - External contour is symmetric, no gross deflection or scars.  Nasal mucosa is pink and moist with no abnormal mucus.  The septum was midline and non-obstructive, turbinates of normal size and position.  No polyps, masses, or purulence noted on examination.    Neuro - Nonfocal neuro exam is normal, CN 2 through 12 intact, normal gait and muscle tone.      Performed in clinic today:  No procedures preformed in clinic today      A/P - Liliana Poole is a 47 year old female who presents to me today for a recheck on her ear problems. Patient will continues her neurontin as prescribed by her neurologist. I advised her to continue with physical therapy. When she begins to get URI symptoms I recommended with afrin and saline nasal spray.     Liliana should follow up in in 1 year.      At Liliana next appointment they will need a hearing test.      This document serves as a record of the services and decisions personally performed and made by Dr. Julian Ha MD. It was created on his behalf by Ewelina Fritz, a trained medical scribe. The creation of this document is based the provider's statements to the medical scribe.  Ewelina Fritz 12:58 PM 12/6/2017    Provider:   The information in this document, created by the medical scribe for me, accurately reflects the services I personally performed and the decisions made by me. I have reviewed and approved this document for accuracy prior to leaving the patient care area.  Dr. Julian Ha MD 12:58 PM 12/6/2017    Julian Ha MD      Again, thank you for allowing me to participate in the care of your patient.        Sincerely,        Julian Ha MD, MD

## 2017-12-11 ENCOUNTER — THERAPY VISIT (OUTPATIENT)
Dept: PHYSICAL THERAPY | Facility: CLINIC | Age: 47
End: 2017-12-11
Payer: COMMERCIAL

## 2017-12-11 DIAGNOSIS — M54.2 CERVICAL PAIN: ICD-10-CM

## 2017-12-11 PROCEDURE — 97112 NEUROMUSCULAR REEDUCATION: CPT | Mod: GP | Performed by: PHYSICAL THERAPIST

## 2017-12-11 PROCEDURE — 97110 THERAPEUTIC EXERCISES: CPT | Mod: GP | Performed by: PHYSICAL THERAPIST

## 2017-12-11 PROCEDURE — 97140 MANUAL THERAPY 1/> REGIONS: CPT | Mod: GP | Performed by: PHYSICAL THERAPIST

## 2017-12-11 NOTE — MR AVS SNAPSHOT
"              After Visit Summary   2017    Liliana Poole    MRN: [id number]           Patient Information     Date Of Birth          1970        Visit Information        Provider Department      2017 2:30 PM Kayode Varela PT [facility]        Today's Diagnoses     Cervical pain           Follow-ups after your visit        Who to contact     If you have questions or need follow up information about today's clinic visit or your schedule please contact [facility] directly at [phone].  Normal or non-critical lab and imaging results will be communicated to you by [employer], letter or phone within 4 business days after the clinic has received the results. If you do not hear from us within 7 days, please contact the clinic through [employer] or phone. If you have a critical or abnormal lab result, we will notify you by phone as soon as possible.  Submit refill requests through [email or web address] or call your pharmacy and they will forward the refill request to us. Please allow 3 business days for your refill to be completed.          Additional Information About Your Visit        MyChart Information     [email or web address] lets you send messages to your doctor, view your test results, renew your prescriptions, schedule appointments and more. To sign up, go to [email or web address] . Click on \"Log in\" on the left side of the screen, which will take you to the Welcome page. Then click on \"Sign up Now\" on the right side of the page.     You will be asked to enter the access code listed below, as well as some personal information. Please follow the directions to create your username and password.     Your access code is: [email or web address]  Expires: 2017  1:02 PM     Your access code will  in 90 days. If you need help or a new code, please call your [place] clinic or [phone].        Care EveryWhere ID     This " is your Care EveryWhere ID. This could be used by other organizations to access your [place] medical records  [id number]         Blood Pressure from Last 3 Encounters:   10/02/17 133/79   03/31/17 124/82   10/03/16 124/73    Weight from Last 3 Encounters:   10/03/16 95.8 kg (211 lb 4.8 oz)   09/30/16 91.6 kg (202 lb)   05/28/14 92.1 kg (203 lb)              Today, you had the following     No orders found for display       Primary Care Provider Office Phone # Fax #    Francisca Alves [phone]       [facility] [address] [facility] [address]        Equal Access to Services     DAVID NUNEZ : Hadii nila prabhakaro Sobryan, waaxda luqadaha, qaybta kaalmada adeegyada, trevor best . So [facility] [phone].    ATENCIÓN: Si habla español, tiene a hunter disposición servicios gratuitos de asistencia lingüística. [facility] [phone].    We comply with applicable federal civil rights laws and [place] laws. We do not discriminate on the basis of race, color, national origin, age, disability, sex, sexual orientation, or gender identity.            Thank you!     Thank you for choosing [facility]  for your care. Our goal is always to provide you with excellent care. Hearing back from our patients is one way we can continue to improve our services. Please take a few minutes to complete the written survey that you may receive in the mail after your visit with us. Thank you!             Your Updated Medication List - Protect others around you: Learn how to safely use, store and throw away your medicines at www.disposemymeds.org.          This list is accurate as of: 12/11/17  3:10 PM.  Always use your most recent med list.                   Brand Name Dispense Instructions for use Diagnosis    amitriptyline 25 MG tablet    ELAVIL    30 tablet    Take 1 tablet (25 mg) by mouth At Bedtime    Migraine without status  migrainosus, not intractable, unspecified migraine type       aspirin 162 MG EC tablet      Take 162 mg by mouth daily        celecoxib 200 MG capsule    celeBREX    21 capsule    Take 1 capsule (200 mg) by mouth daily for 21 days    Hip pain, right       cephALEXin 500 MG capsule    KEFLEX    20 capsule    Take 1 capsule (500 mg) by mouth 2 times daily    Acute serous otitis media of left ear, recurrence not specified       hydrOXYzine 25 MG capsule    VISTARIL          methylPREDNISolone 4 MG tablet    MEDROL DOSEPAK    21 tablet    Follow package instructions    Meniere's disease, unspecified laterality       MOTRIN IB PO           MUCINEX 600 MG 12 hr tablet   Generic drug:  guaiFENesin      Take 1,200 mg by mouth 2 times daily        oxyCODONE-acetaminophen 5-325 MG per tablet    PERCOCET    60 tablet    Take 1-2 tablets by mouth every 6 hours as needed for moderate to severe pain    Hip pain, right       predniSONE 20 MG tablet    DELTASONE    5 tablet    Take 1 tablet (20 mg) by mouth daily    History of Meniere's disease, Acute serous otitis media of left ear, recurrence not specified, Nasal sinus congestion       REGLAN PO      Take 10 mg by mouth        RELPAX 40 MG tablet   Generic drug:  eletriptan      Take 40 mg by mouth at onset of headache for migraine As needed        scopolamine 72 hr patch    TRANSDERM     Place 1 patch onto the skin every 72 hours Reported on 3/31/2017        SUDAFED PO           TYLENOL PO           VITAMIN D-1000 MAX ST 1000 UNITS Tabs   Generic drug:  cholecalciferol      Take 1,000 Units by mouth        * ZOFRAN PO           * ondansetron 4 MG tablet    ZOFRAN    18 tablet    Take 1 tablet (4 mg) by mouth every 8 hours as needed for nausea    History of Meniere's disease       * Notice:  This list has 2 medication(s) that are the same as other medications prescribed for you. Read the directions carefully, and ask your doctor or other care provider to review them with you.

## 2017-12-20 ENCOUNTER — THERAPY VISIT (OUTPATIENT)
Dept: PHYSICAL THERAPY | Facility: CLINIC | Age: 47
End: 2017-12-20
Payer: COMMERCIAL

## 2017-12-20 DIAGNOSIS — M54.2 CERVICAL PAIN: ICD-10-CM

## 2017-12-20 PROCEDURE — 97110 THERAPEUTIC EXERCISES: CPT | Mod: GP | Performed by: PHYSICAL THERAPIST

## 2017-12-20 PROCEDURE — 97140 MANUAL THERAPY 1/> REGIONS: CPT | Mod: GP | Performed by: PHYSICAL THERAPIST

## 2017-12-20 NOTE — PROGRESS NOTES
[facility] Evaluation  Subjective:    HPI                    Objective:    System    Physical Exam    General     ROS    Assessment/Plan:      DISCHARGE REPORT    Progress reporting period is from 11-8-17 to 12-20-17.       SUBJECTIVE  Subjective: Pt reports she had a mild set back, went out of town and slept in a strange bed and woke up with increased pain. States it is improving as time goes on, but remains painful. Her balance is no longer an issue, no difficulty with stance with eyes open and with eyes closed. Tandem stance eyes closed to over 60 seconds, rhomberg stance eyes closed to 45 seconds. Pt feels she has met her LTG at this time and desires to continue on her own.     Current pain level is 2/10 Current Pain level: 2/10.     Previous pain level was  1/10 Initial Pain level: 2/10.   Changes in function:  Yes (See Goal flowsheet attached for changes in current functional level)  Adverse reaction to treatment or activity: None    OBJECTIVE  Changes noted in objective findings:    Objective: Cx ROM flexion full, no pain, stiffness, extension 70 deg, stiffness, SB L and R 30 deg, stiffness, rotation L 80 deg, no pain. Pt has met all LTG, will discharge with HEP.      ASSESSMENT/PLAN  Updated problem list and treatment plan: Diagnosis 1:  Cervical pain, dizziness   Pain -  home program  STG/LTGs have been met or progress has been made towards goals:  Yes (See Goal flow sheet completed today.)  Assessment of Progress: The patient has met all of their long term goals.  Self Management Plans:  Patient has been instructed in a home treatment program.  Patient is independent in a home treatment program.  Patient  has been instructed in self management of symptoms.  Patient is independent in self management of symptoms.    Liliana continues to require the following intervention to meet STG and LTG's:  PT intervention is no longer required to meet STG/LTG.    Recommendations:  This patient is  ready to be discharged from therapy and continue their home treatment program.    Please refer to the daily flowsheet for treatment today, total treatment time and time spent performing 1:1 timed codes.

## 2017-12-20 NOTE — MR AVS SNAPSHOT
"              After Visit Summary   2017    Liliana Poole    MRN: [id number]           Patient Information     Date Of Birth          1970        Visit Information        Provider Department      2017 12:00 PM Kayode Varela PT [facility]        Today's Diagnoses     Cervical pain           Follow-ups after your visit        Who to contact     If you have questions or need follow up information about today's clinic visit or your schedule please contact [facility] [employer] [facility] directly at [phone].  Normal or non-critical lab and imaging results will be communicated to you by Mamahart, letter or phone within 4 business days after the clinic has received the results. If you do not hear from us within 7 days, please contact the clinic through Mamahart or phone. If you have a critical or abnormal lab result, we will notify you by phone as soon as possible.  Submit refill requests through ePrivateHire or call your pharmacy and they will forward the refill request to us. Please allow 3 business days for your refill to be completed.          Additional Information About Your Visit        MyChart Information     ePrivateHire lets you send messages to your doctor, view your test results, renew your prescriptions, schedule appointments and more. To sign up, go to [email or web address] . Click on \"Log in\" on the left side of the screen, which will take you to the Welcome page. Then click on \"Sign up Now\" on the right side of the page.     You will be asked to enter the access code listed below, as well as some personal information. Please follow the directions to create your username and password.     Your access code is: [email or web address]  Expires: 2017  1:02 PM     Your access code will  in 90 days. If you need help or a new code, please call your [place] clinic or [phone].        Care EveryWhere ID     " This is your Care EveryWhere ID. This could be used by other organizations to access your [place] medical records  [id number]         Blood Pressure from Last 3 Encounters:   10/02/17 133/79   03/31/17 124/82   10/03/16 124/73    Weight from Last 3 Encounters:   10/03/16 95.8 kg (211 lb 4.8 oz)   09/30/16 91.6 kg (202 lb)   05/28/14 92.1 kg (203 lb)              We Performed the Following     MANUAL THER TECH,1+REGIONS,EA 15 MIN     THERAPEUTIC EXERCISES        Primary Care Provider Office Phone # Fax #    Francisca Long [phone]       [place] [address] [facility] [address]        Equal Access to Services     DAVID NUNEZ : Hadii nila prabhakaro Solilianali, waaxda luqadaha, qaybta kaalmada adeegyada, trevor best . So [facility] [phone].    ATENCIÓN: Si habla español, tiene a hunter disposición servicios gratuitos de asistencia lingüística. [facility] [phone].    We comply with applicable federal civil rights laws and [place] laws. We do not discriminate on the basis of race, color, national origin, age, disability, sex, sexual orientation, or gender identity.            Thank you!     Thank you for choosing [facility]  for your care. Our goal is always to provide you with excellent care. Hearing back from our patients is one way we can continue to improve our services. Please take a few minutes to complete the written survey that you may receive in the mail after your visit with us. Thank you!             Your Updated Medication List - Protect others around you: Learn how to safely use, store and throw away your medicines at www.disposemymeds.org.          This list is accurate as of: 12/20/17 12:37 PM.  Always use your most recent med list.                   Brand Name Dispense Instructions for use Diagnosis    amitriptyline 25 MG tablet    ELAVIL    30 tablet    Take 1 tablet (25 mg) by mouth At  Bedtime    Migraine without status migrainosus, not intractable, unspecified migraine type       aspirin 162 MG EC tablet      Take 162 mg by mouth daily        celecoxib 200 MG capsule    celeBREX    21 capsule    Take 1 capsule (200 mg) by mouth daily for 21 days    Hip pain, right       cephALEXin 500 MG capsule    KEFLEX    20 capsule    Take 1 capsule (500 mg) by mouth 2 times daily    Acute serous otitis media of left ear, recurrence not specified       hydrOXYzine 25 MG capsule    VISTARIL          methylPREDNISolone 4 MG tablet    MEDROL DOSEPAK    21 tablet    Follow package instructions    Meniere's disease, unspecified laterality       MOTRIN IB PO           MUCINEX 600 MG 12 hr tablet   Generic drug:  guaiFENesin      Take 1,200 mg by mouth 2 times daily        oxyCODONE-acetaminophen 5-325 MG per tablet    PERCOCET    60 tablet    Take 1-2 tablets by mouth every 6 hours as needed for moderate to severe pain    Hip pain, right       predniSONE 20 MG tablet    DELTASONE    5 tablet    Take 1 tablet (20 mg) by mouth daily    History of Meniere's disease, Acute serous otitis media of left ear, recurrence not specified, Nasal sinus congestion       REGLAN PO      Take 10 mg by mouth        RELPAX 40 MG tablet   Generic drug:  eletriptan      Take 40 mg by mouth at onset of headache for migraine As needed        scopolamine 72 hr patch    TRANSDERM     Place 1 patch onto the skin every 72 hours Reported on 3/31/2017        SUDAFED PO           TYLENOL PO           VITAMIN D-1000 MAX ST 1000 UNITS Tabs   Generic drug:  cholecalciferol      Take 1,000 Units by mouth        * ZOFRAN PO           * ondansetron 4 MG tablet    ZOFRAN    18 tablet    Take 1 tablet (4 mg) by mouth every 8 hours as needed for nausea    History of Meniere's disease       * Notice:  This list has 2 medication(s) that are the same as other medications prescribed for you. Read the directions carefully, and ask your doctor or other care  provider to review them with you.

## 2018-05-22 ENCOUNTER — OFFICE VISIT (OUTPATIENT)
Dept: URGENT CARE | Facility: RETAIL CLINIC | Age: 48
End: 2018-05-22
Payer: COMMERCIAL

## 2018-05-22 VITALS
HEART RATE: 70 BPM | TEMPERATURE: 79.4 F | OXYGEN SATURATION: 99 % | DIASTOLIC BLOOD PRESSURE: 79 MMHG | SYSTOLIC BLOOD PRESSURE: 124 MMHG

## 2018-05-22 DIAGNOSIS — J01.90 ACUTE SINUSITIS WITH COEXISTING CONDITION, NEED PROPHYLACTIC TREATMENT: Primary | ICD-10-CM

## 2018-05-22 PROCEDURE — 99213 OFFICE O/P EST LOW 20 MIN: CPT | Performed by: PHYSICIAN ASSISTANT

## 2018-05-22 RX ORDER — CEFUROXIME AXETIL 500 MG/1
500 TABLET ORAL 2 TIMES DAILY
Qty: 20 TABLET | Refills: 0 | Status: SHIPPED | OUTPATIENT
Start: 2018-05-22 | End: 2018-06-01

## 2018-05-22 RX ORDER — RIZATRIPTAN BENZOATE 10 MG/1
TABLET, ORALLY DISINTEGRATING ORAL
COMMUNITY
Start: 2017-08-23

## 2018-05-22 RX ORDER — POLYETHYLENE GLYCOL 3350 17 G/17G
17 POWDER, FOR SOLUTION ORAL
Status: ON HOLD | COMMUNITY
End: 2019-12-27

## 2018-05-22 RX ORDER — METHOCARBAMOL 750 MG/1
750 TABLET, FILM COATED ORAL
COMMUNITY
Start: 2016-05-31 | End: 2018-05-22

## 2018-05-22 RX ORDER — GABAPENTIN 100 MG/1
CAPSULE ORAL
Status: ON HOLD | COMMUNITY
Start: 2017-12-04 | End: 2019-12-27

## 2018-05-22 RX ORDER — HYDROXYZINE PAMOATE 25 MG/1
CAPSULE ORAL
COMMUNITY
Start: 2017-08-28 | End: 2018-08-28

## 2018-05-22 NOTE — MR AVS SNAPSHOT
After Visit Summary   5/22/2018    Liliana Poole    MRN: [id number]           Patient Information     Date Of Birth          1970        Visit Information        Provider Department      5/22/2018 12:20 PM Oriana Horta PA-C [facility]        Today's Diagnoses     Acute sinusitis with coexisting condition, need prophylactic treatment    -  1      Care Instructions    Ceftin twice daily for 10 days.  Flonase 2 sprays in each nostril daily until symptoms resolve, then continue 1 spray in each nostril for at least 5 more days.  Afrin (oxymetazoline) nasal spray twice daily for 3 days. Stop after 3 days.  Sudafed (pseudoephedrine) behind the pharmacist counter for 3-5 days helps relieve congestion.  Take Tylenol or an NSAID such as ibuprofen or naproxen as needed for pain.  May use netti pot with bottled or distilled water and saline packets to flush sinuses.  Mucinex (guiafenesin) thins mucus and may help it to loosen more quickly  Saline drops or nasal sprays may loosen mucus.  Sit in the bathroom with the door closed and hot shower running to loosen mucus.  Contact primary care clinic if you do not have any relief from your symptoms after 10 days.  Present to emergency room for significantly increasing pain, persistent high fever >102F, swelling/redness around your eyes, changes in your vision or ability to move your eyes, altered mental status or a severe headache.          Follow-ups after your visit        Who to contact     You can reach your care team any time of the day by calling [phone].  Notification of test results:  If you have an abnormal lab result, we will notify you by phone as soon as possible.         Additional Information About Your Visit        MyChart Information     [employer] lets you send messages to your doctor, view your test results, renew your prescriptions, schedule appointments and more. To sign up, go to [email or web address] .  "Click on \"Log in\" on the left side of the screen, which will take you to the Welcome page. Then click on \"Sign up Now\" on the right side of the page.     You will be asked to enter the access code listed below, as well as some personal information. Please follow the directions to create your username and password.     Your access code is: [email or web address]  Expires: 2018 12:38 PM     Your access code will  in 90 days. If you need help or a new code, please call your [place] clinic or [phone].        Care EveryWhere ID     This is your Care EveryWhere ID. This could be used by other organizations to access your [place] medical records  [id number]        Your Vitals Were     Pulse Temperature Last Period Pulse Oximetry          70 79.4  F (26.3  C) (Temporal) 2018 99%         Blood Pressure from Last 3 Encounters:   18 124/79   10/02/17 133/79   17 124/82    Weight from Last 3 Encounters:   10/03/16 211 lb 4.8 oz (95.8 kg)   16 202 lb (91.6 kg)   14 203 lb (92.1 kg)              Today, you had the following     No orders found for display         Today's Medication Changes          These changes are accurate as of 18 12:38 PM.  If you have any questions, ask your nurse or doctor.               Start taking these medicines.        Dose/Directions    cefuroxime 500 MG tablet   Commonly known as:  CEFTIN   Used for:  Acute sinusitis with coexisting condition, need prophylactic treatment   Started by:  Oriana Horta PA-C        Dose:  500 mg   Take 1 tablet (500 mg) by mouth 2 times daily for 10 days   Quantity:  20 tablet   Refills:  0         These medicines have changed or have updated prescriptions.        Dose/Directions    hydrOXYzine 25 MG capsule   Commonly known as:  VISTARIL   This may have changed:  Another medication with the same name was removed. Continue taking this medication, and follow the directions you see here.   Changed by:  Oriana Horta" MARQUITA        TAKE ONE TO TWO CAPSULES BY MOUTH AT BEDTIME AS NEEDED DURING PROTRACTED MIGRAINES   Refills:  0       ondansetron 4 MG tablet   Commonly known as:  ZOFRAN   This may have changed:  Another medication with the same name was removed. Continue taking this medication, and follow the directions you see here.   Used for:  History of Meniere's disease   Changed by:  Oriana Horta PA-C        Dose:  4 mg   Take 1 tablet (4 mg) by mouth every 8 hours as needed for nausea   Quantity:  18 tablet   Refills:  0         Stop taking these medicines if you haven't already. Please contact your care team if you have questions.     amitriptyline 25 MG tablet   Commonly known as:  ELAVIL   Stopped by:  Oriana Horta PA-C           celecoxib 200 MG capsule   Commonly known as:  celeBREX   Stopped by:  Oriana Horta PA-C           methocarbamol 750 MG tablet   Commonly known as:  ROBAXIN   Stopped by:  Oriana Horta PA-C           RELPAX 40 MG tablet   Generic drug:  eletriptan   Stopped by:  Oriana Horta PA-C                Where to get your medicines      These medications were sent to [facility] [address] [place] [id number] [address] [address]     Phone:  [phone]     cefuroxime 500 MG tablet                Primary Care Provider Office Phone # Fax #    Francisca Long [phone]       [address] [address]        Equal Access to Services     [facility]: Hadii nila ku hadasho Soomaali, waaxda luqadaha, qaybta kaalmada adeegyada, waxay radha hayrodrick best . So [facility] [phone].    ATENCIÓN: Si habla frank, tiene a hunter disposición servicios gratuitos de asistencia lingüística. Prieto al [phone].    We comply with applicable federal civil rights laws and [place] laws. We do not discriminate on the basis of race, color, national origin, age, disability, sex, sexual  orientation, or gender identity.            Thank you!     Thank you for choosing [facility]  for your care. Our goal is always to provide you with excellent care. Hearing back from our patients is one way we can continue to improve our services. Please take a few minutes to complete the written survey that you may receive in the mail after your visit with us. Thank you!             Your Updated Medication List - Protect others around you: Learn how to safely use, store and throw away your medicines at www.disposemymeds.org.          This list is accurate as of 5/22/18 12:38 PM.  Always use your most recent med list.                   Brand Name Dispense Instructions for use Diagnosis    aspirin 162 MG EC tablet      Take 162 mg by mouth daily        cefuroxime 500 MG tablet    CEFTIN    20 tablet    Take 1 tablet (500 mg) by mouth 2 times daily for 10 days    Acute sinusitis with coexisting condition, need prophylactic treatment       gabapentin 100 MG capsule    NEURONTIN          hydrOXYzine 25 MG capsule    VISTARIL     TAKE ONE TO TWO CAPSULES BY MOUTH AT BEDTIME AS NEEDED DURING PROTRACTED MIGRAINES        MG-PLUS PROTEIN PO      Take 300 mg by mouth        MOTRIN IB PO           ondansetron 4 MG tablet    ZOFRAN    18 tablet    Take 1 tablet (4 mg) by mouth every 8 hours as needed for nausea    History of Meniere's disease       polyethylene glycol Packet    MIRALAX/GLYCOLAX     Take 17 g by mouth        REGLAN PO      Take 10 mg by mouth        rizatriptan 10 MG ODT tab    MAXALT-MLT          SUDAFED PO           TYLENOL PO           VITAMIN D-1000 MAX ST 1000 units Tabs   Generic drug:  cholecalciferol      Take 2,000 Units by mouth 2 times daily

## 2018-05-22 NOTE — PATIENT INSTRUCTIONS
Ceftin twice daily for 10 days.  Flonase 2 sprays in each nostril daily until symptoms resolve, then continue 1 spray in each nostril for at least 5 more days.  Afrin (oxymetazoline) nasal spray twice daily for 3 days. Stop after 3 days.  Sudafed (pseudoephedrine) behind the pharmacist counter for 3-5 days helps relieve congestion.  Take Tylenol or an NSAID such as ibuprofen or naproxen as needed for pain.  May use netti pot with bottled or distilled water and saline packets to flush sinuses.  Mucinex (guiafenesin) thins mucus and may help it to loosen more quickly  Saline drops or nasal sprays may loosen mucus.  Sit in the bathroom with the door closed and hot shower running to loosen mucus.  Contact primary care clinic if you do not have any relief from your symptoms after 10 days.  Present to emergency room for significantly increasing pain, persistent high fever >102F, swelling/redness around your eyes, changes in your vision or ability to move your eyes, altered mental status or a severe headache.

## 2018-05-22 NOTE — LETTER
[facility]  [address] [facility] [id number]      May 22, 2018    Liliana GONZALEZ Zulema  [address] [address] [place] [id number]        To whom it may concern:    Liliana was seen at our clinic today for an acute illness. Please excuse her from work missed today.        Sincerely,        Oriana Horta PA-C

## 2018-05-22 NOTE — PROGRESS NOTES
Chief Complaint   Patient presents with     Sinus Problem     8 days.      SUBJECTIVE:  Liliana Poole is a 48 year old female here with concerns about sinus infection.  She states onset of symptoms was 8 days ago.    Course of illness is worsening.   Severity moderate  She has had maxillary pressure as well as nasal congestion, headache, post-nasal drainage and nausea  Predisposing factors include seasonal allergies.   Recent treatment has included: Zyrtec, Sudafed, Mucinex and NyQuil    Past Medical History:   Diagnosis Date     GERD (gastroesophageal reflux disease)      Hip pain      History of Helicobacter pylori infection      Hyperlipidaemia      Hypertension      Insomnia      Meniere's disease      Migraines      Motion sickness      Obese      Patent foramen ovale      PFO (patent foramen ovale)     SUKHWINDER 8/18/2010,echo 6/6/2008     PONV (postoperative nausea and vomiting)      Current Outpatient Prescriptions   Medication Sig Dispense Refill     Acetaminophen (TYLENOL PO)        aspirin 162 MG EC tablet Take 162 mg by mouth daily       cefuroxime (CEFTIN) 500 MG tablet Take 1 tablet (500 mg) by mouth 2 times daily for 10 days 20 tablet 0     cholecalciferol (VITAMIN D-1000 MAX ST) 1000 UNITS TABS Take 2,000 Units by mouth 2 times daily        gabapentin (NEURONTIN) 100 MG capsule        hydrOXYzine (VISTARIL) 25 MG capsule TAKE ONE TO TWO CAPSULES BY MOUTH AT BEDTIME AS NEEDED DURING PROTRACTED MIGRAINES       Metoclopramide HCl (REGLAN PO) Take 10 mg by mouth       MOTRIN IB PO        ondansetron (ZOFRAN) 4 MG tablet Take 1 tablet (4 mg) by mouth every 8 hours as needed for nausea 18 tablet 0     polyethylene glycol (MIRALAX/GLYCOLAX) Packet Take 17 g by mouth       Pseudoephedrine HCl (SUDAFED PO)        rizatriptan (MAXALT-MLT) 10 MG ODT tab        Specialty Vitamins Products (MG-PLUS PROTEIN PO) Take 300 mg by mouth       Social History   Substance Use Topics     Smoking status: Never Smoker      Smokeless tobacco: Never Used     Alcohol use Yes     Allergies   Allergen Reactions     Atorvastatin      Muscle aches     Contrast Dye Hives     Crestor [Rosuvastatin]      Muscle aches     Pravastatin      Muscle aches     Simvastatin      Muscle aches     Augmented Betamethasone Diprop [Betamethasone] Rash     Augmentin Rash     Sulfa Drugs Nausea and Vomiting     Verapamil Rash     ROS:  Review of systems negative except as stated above.    OBJECTIVE:  /79 (Cuff Size: Adult Large)  Pulse 70  Temp (!) 79.4  F (26.3  C) (Temporal)  LMP 05/07/2018  SpO2 99%  GENERAL APPEARANCE: healthy, alert and no distress  EYES: PERRL, conjunctiva clear  HENT: Pain with palpation over frontal and maxillary sinuses. Ear canals normal TMs with mild serous effusions bilaterally. Nasal turbinates edematous and boggy bilaterally. Posterior pharynx is not erythematous.  NECK: supple, nontender, no lymphadenopathy  RESP: lungs clear to auscultation - no rales, rhonchi or wheezes  CV: regular rates and rhythm, normal S1 S2, no murmur noted    ASSESSMENT:    ICD-10-CM    1. Acute sinusitis with coexisting condition, need prophylactic treatment J01.90 cefuroxime (CEFTIN) 500 MG tablet     PLAN:   Patient Instructions   Ceftin twice daily for 10 days.  Flonase 2 sprays in each nostril daily until symptoms resolve, then continue 1 spray in each nostril for at least 5 more days.  Afrin (oxymetazoline) nasal spray twice daily for 3 days. Stop after 3 days.  Sudafed (pseudoephedrine) behind the pharmacist counter for 3-5 days helps relieve congestion.  Take Tylenol or an NSAID such as ibuprofen or naproxen as needed for pain.  May use netti pot with bottled or distilled water and saline packets to flush sinuses.  Mucinex (guiafenesin) thins mucus and may help it to loosen more quickly  Saline drops or nasal sprays may loosen mucus.  Sit in the bathroom with the door closed and hot shower running to loosen mucus.  Contact primary  care clinic if you do not have any relief from your symptoms after 10 days.  Present to emergency room for significantly increasing pain, persistent high fever >102F, swelling/redness around your eyes, changes in your vision or ability to move your eyes, altered mental status or a severe headache.    Follow up with primary care provider with any problems, questions or concerns or if symptoms worsen or fail to improve. Patient agreed to plan and verbalized understanding.    Fely Horta PA-C  [facility] [place]

## 2018-12-10 ENCOUNTER — ANESTHESIA (OUTPATIENT)
Dept: SURGERY | Facility: CLINIC | Age: 48
End: 2018-12-10
Payer: COMMERCIAL

## 2018-12-10 ENCOUNTER — HOSPITAL ENCOUNTER (OUTPATIENT)
Facility: CLINIC | Age: 48
Discharge: HOME | End: 2018-12-10
Attending: ORTHOPAEDIC SURGERY | Admitting: ORTHOPAEDIC SURGERY
Payer: COMMERCIAL

## 2018-12-10 ENCOUNTER — ANESTHESIA EVENT (OUTPATIENT)
Dept: SURGERY | Facility: CLINIC | Age: 48
End: 2018-12-10
Payer: COMMERCIAL

## 2018-12-10 VITALS
BODY MASS INDEX: 33.99 KG/M2 | HEIGHT: 65 IN | RESPIRATION RATE: 16 BRPM | TEMPERATURE: 97 F | OXYGEN SATURATION: 98 % | WEIGHT: 204 LBS | SYSTOLIC BLOOD PRESSURE: 124 MMHG | DIASTOLIC BLOOD PRESSURE: 74 MMHG | HEART RATE: 54 BPM

## 2018-12-10 DIAGNOSIS — Z98.890 S/P FOOT SURGERY, RIGHT: Primary | ICD-10-CM

## 2018-12-10 LAB — HCG UR QL: NEGATIVE

## 2018-12-10 PROCEDURE — 27210794 ZZH OR GENERAL SUPPLY STERILE: Performed by: ORTHOPAEDIC SURGERY

## 2018-12-10 PROCEDURE — 25000131 ZZH RX MED GY IP 250 OP 636 PS 637: Performed by: ANESTHESIOLOGY

## 2018-12-10 PROCEDURE — 36000058 ZZH SURGERY LEVEL 3 EA 15 ADDTL MIN: Performed by: ORTHOPAEDIC SURGERY

## 2018-12-10 PROCEDURE — 25000128 H RX IP 250 OP 636: Performed by: ORTHOPAEDIC SURGERY

## 2018-12-10 PROCEDURE — 40000170 ZZH STATISTIC PRE-PROCEDURE ASSESSMENT II: Performed by: ORTHOPAEDIC SURGERY

## 2018-12-10 PROCEDURE — 71000013 ZZH RECOVERY PHASE 1 LEVEL 1 EA ADDTL HR: Performed by: ORTHOPAEDIC SURGERY

## 2018-12-10 PROCEDURE — 71000012 ZZH RECOVERY PHASE 1 LEVEL 1 FIRST HR: Performed by: ORTHOPAEDIC SURGERY

## 2018-12-10 PROCEDURE — 25000132 ZZH RX MED GY IP 250 OP 250 PS 637: Performed by: PHYSICIAN ASSISTANT

## 2018-12-10 PROCEDURE — 36000056 ZZH SURGERY LEVEL 3 1ST 30 MIN: Performed by: ORTHOPAEDIC SURGERY

## 2018-12-10 PROCEDURE — 25000125 ZZHC RX 250: Performed by: NURSE ANESTHETIST, CERTIFIED REGISTERED

## 2018-12-10 PROCEDURE — 25000128 H RX IP 250 OP 636: Performed by: PHYSICIAN ASSISTANT

## 2018-12-10 PROCEDURE — 37000008 ZZH ANESTHESIA TECHNICAL FEE, 1ST 30 MIN: Performed by: ORTHOPAEDIC SURGERY

## 2018-12-10 PROCEDURE — 25000128 H RX IP 250 OP 636: Performed by: ANESTHESIOLOGY

## 2018-12-10 PROCEDURE — 81025 URINE PREGNANCY TEST: CPT | Performed by: ORTHOPAEDIC SURGERY

## 2018-12-10 PROCEDURE — 71000027 ZZH RECOVERY PHASE 2 EACH 15 MINS: Performed by: ORTHOPAEDIC SURGERY

## 2018-12-10 PROCEDURE — 25000128 H RX IP 250 OP 636: Performed by: NURSE ANESTHETIST, CERTIFIED REGISTERED

## 2018-12-10 PROCEDURE — 37000009 ZZH ANESTHESIA TECHNICAL FEE, EACH ADDTL 15 MIN: Performed by: ORTHOPAEDIC SURGERY

## 2018-12-10 PROCEDURE — 27110028 ZZH OR GENERAL SUPPLY NON-STERILE: Performed by: ORTHOPAEDIC SURGERY

## 2018-12-10 RX ORDER — NALOXONE HYDROCHLORIDE 0.4 MG/ML
.1-.4 INJECTION, SOLUTION INTRAMUSCULAR; INTRAVENOUS; SUBCUTANEOUS
Status: DISCONTINUED | OUTPATIENT
Start: 2018-12-10 | End: 2018-12-10 | Stop reason: HOSPADM

## 2018-12-10 RX ORDER — ONDANSETRON 2 MG/ML
INJECTION INTRAMUSCULAR; INTRAVENOUS PRN
Status: DISCONTINUED | OUTPATIENT
Start: 2018-12-10 | End: 2018-12-10

## 2018-12-10 RX ORDER — ROPIVACAINE HYDROCHLORIDE 5 MG/ML
INJECTION, SOLUTION EPIDURAL; INFILTRATION; PERINEURAL PRN
Status: DISCONTINUED | OUTPATIENT
Start: 2018-12-10 | End: 2018-12-10 | Stop reason: HOSPADM

## 2018-12-10 RX ORDER — DEXAMETHASONE SODIUM PHOSPHATE 4 MG/ML
INJECTION, SOLUTION INTRA-ARTICULAR; INTRALESIONAL; INTRAMUSCULAR; INTRAVENOUS; SOFT TISSUE PRN
Status: DISCONTINUED | OUTPATIENT
Start: 2018-12-10 | End: 2018-12-10

## 2018-12-10 RX ORDER — MEPERIDINE HYDROCHLORIDE 25 MG/ML
12.5 INJECTION INTRAMUSCULAR; INTRAVENOUS; SUBCUTANEOUS
Status: DISCONTINUED | OUTPATIENT
Start: 2018-12-10 | End: 2018-12-10 | Stop reason: HOSPADM

## 2018-12-10 RX ORDER — PROPOFOL 10 MG/ML
INJECTION, EMULSION INTRAVENOUS CONTINUOUS PRN
Status: DISCONTINUED | OUTPATIENT
Start: 2018-12-10 | End: 2018-12-10

## 2018-12-10 RX ORDER — SODIUM CHLORIDE, SODIUM LACTATE, POTASSIUM CHLORIDE, CALCIUM CHLORIDE 600; 310; 30; 20 MG/100ML; MG/100ML; MG/100ML; MG/100ML
INJECTION, SOLUTION INTRAVENOUS CONTINUOUS
Status: DISCONTINUED | OUTPATIENT
Start: 2018-12-10 | End: 2018-12-10 | Stop reason: HOSPADM

## 2018-12-10 RX ORDER — ONDANSETRON 4 MG/1
4-8 TABLET, ORALLY DISINTEGRATING ORAL EVERY 6 HOURS PRN
Qty: 15 TABLET | Refills: 0 | Status: SHIPPED | OUTPATIENT
Start: 2018-12-10 | End: 2018-12-17

## 2018-12-10 RX ORDER — PROPOFOL 10 MG/ML
INJECTION, EMULSION INTRAVENOUS PRN
Status: DISCONTINUED | OUTPATIENT
Start: 2018-12-10 | End: 2018-12-10

## 2018-12-10 RX ORDER — CEFAZOLIN SODIUM 2 G/100ML
2 INJECTION, SOLUTION INTRAVENOUS
Status: COMPLETED | OUTPATIENT
Start: 2018-12-10 | End: 2018-12-10

## 2018-12-10 RX ORDER — ONDANSETRON 4 MG/1
4 TABLET, ORALLY DISINTEGRATING ORAL EVERY 30 MIN PRN
Status: DISCONTINUED | OUTPATIENT
Start: 2018-12-10 | End: 2018-12-10 | Stop reason: HOSPADM

## 2018-12-10 RX ORDER — HYDROCODONE BITARTRATE AND ACETAMINOPHEN 5; 325 MG/1; MG/1
1 TABLET ORAL
Status: COMPLETED | OUTPATIENT
Start: 2018-12-10 | End: 2018-12-10

## 2018-12-10 RX ORDER — HYDROCODONE BITARTRATE AND ACETAMINOPHEN 5; 325 MG/1; MG/1
1-2 TABLET ORAL EVERY 4 HOURS PRN
Qty: 30 TABLET | Refills: 0 | Status: SHIPPED | OUTPATIENT
Start: 2018-12-10 | End: 2018-12-13

## 2018-12-10 RX ORDER — ONDANSETRON 4 MG/1
4 TABLET, ORALLY DISINTEGRATING ORAL
Status: DISCONTINUED | OUTPATIENT
Start: 2018-12-10 | End: 2018-12-10 | Stop reason: HOSPADM

## 2018-12-10 RX ORDER — FENTANYL CITRATE 50 UG/ML
25-50 INJECTION, SOLUTION INTRAMUSCULAR; INTRAVENOUS
Status: DISCONTINUED | OUTPATIENT
Start: 2018-12-10 | End: 2018-12-10 | Stop reason: HOSPADM

## 2018-12-10 RX ORDER — APREPITANT 40 MG/1
40 CAPSULE ORAL ONCE
Status: COMPLETED | OUTPATIENT
Start: 2018-12-10 | End: 2018-12-10

## 2018-12-10 RX ORDER — ONDANSETRON 2 MG/ML
4 INJECTION INTRAMUSCULAR; INTRAVENOUS EVERY 30 MIN PRN
Status: DISCONTINUED | OUTPATIENT
Start: 2018-12-10 | End: 2018-12-10 | Stop reason: HOSPADM

## 2018-12-10 RX ORDER — IBUPROFEN 600 MG/1
600 TABLET, FILM COATED ORAL EVERY 6 HOURS PRN
Qty: 30 TABLET | Refills: 0 | Status: ON HOLD | OUTPATIENT
Start: 2018-12-10 | End: 2019-12-27

## 2018-12-10 RX ORDER — HYDROMORPHONE HYDROCHLORIDE 1 MG/ML
.3-.5 INJECTION, SOLUTION INTRAMUSCULAR; INTRAVENOUS; SUBCUTANEOUS EVERY 10 MIN PRN
Status: DISCONTINUED | OUTPATIENT
Start: 2018-12-10 | End: 2018-12-10 | Stop reason: HOSPADM

## 2018-12-10 RX ORDER — FENTANYL CITRATE 50 UG/ML
INJECTION, SOLUTION INTRAMUSCULAR; INTRAVENOUS PRN
Status: DISCONTINUED | OUTPATIENT
Start: 2018-12-10 | End: 2018-12-10

## 2018-12-10 RX ORDER — AMOXICILLIN 250 MG
1-2 CAPSULE ORAL 2 TIMES DAILY
Qty: 30 TABLET | Refills: 0 | Status: SHIPPED | OUTPATIENT
Start: 2018-12-10

## 2018-12-10 RX ORDER — SODIUM CHLORIDE, SODIUM LACTATE, POTASSIUM CHLORIDE, CALCIUM CHLORIDE 600; 310; 30; 20 MG/100ML; MG/100ML; MG/100ML; MG/100ML
INJECTION, SOLUTION INTRAVENOUS CONTINUOUS PRN
Status: DISCONTINUED | OUTPATIENT
Start: 2018-12-10 | End: 2018-12-10

## 2018-12-10 RX ORDER — CEFAZOLIN SODIUM 1 G/3ML
1 INJECTION, POWDER, FOR SOLUTION INTRAMUSCULAR; INTRAVENOUS SEE ADMIN INSTRUCTIONS
Status: DISCONTINUED | OUTPATIENT
Start: 2018-12-10 | End: 2018-12-10 | Stop reason: HOSPADM

## 2018-12-10 RX ADMIN — HYDROCODONE BITARTRATE AND ACETAMINOPHEN 1 TABLET: 5; 325 TABLET ORAL at 12:17

## 2018-12-10 RX ADMIN — FENTANYL CITRATE 25 MCG: 50 INJECTION, SOLUTION INTRAMUSCULAR; INTRAVENOUS at 10:27

## 2018-12-10 RX ADMIN — PROPOFOL 180 MCG/KG/MIN: 10 INJECTION, EMULSION INTRAVENOUS at 10:13

## 2018-12-10 RX ADMIN — FENTANYL CITRATE 25 MCG: 50 INJECTION, SOLUTION INTRAMUSCULAR; INTRAVENOUS at 10:13

## 2018-12-10 RX ADMIN — FENTANYL CITRATE 50 MCG: 50 INJECTION, SOLUTION INTRAMUSCULAR; INTRAVENOUS at 11:25

## 2018-12-10 RX ADMIN — DEXAMETHASONE SODIUM PHOSPHATE 4 MG: 4 INJECTION, SOLUTION INTRA-ARTICULAR; INTRALESIONAL; INTRAMUSCULAR; INTRAVENOUS; SOFT TISSUE at 10:17

## 2018-12-10 RX ADMIN — CEFAZOLIN SODIUM 2 G: 2 INJECTION, SOLUTION INTRAVENOUS at 10:16

## 2018-12-10 RX ADMIN — APREPITANT 40 MG: 40 CAPSULE ORAL at 10:03

## 2018-12-10 RX ADMIN — ONDANSETRON 4 MG: 2 INJECTION INTRAMUSCULAR; INTRAVENOUS at 10:38

## 2018-12-10 RX ADMIN — Medication: at 10:06

## 2018-12-10 RX ADMIN — PROPOFOL 200 MG: 10 INJECTION, EMULSION INTRAVENOUS at 10:13

## 2018-12-10 RX ADMIN — MIDAZOLAM 2 MG: 1 INJECTION INTRAMUSCULAR; INTRAVENOUS at 10:13

## 2018-12-10 ASSESSMENT — MIFFLIN-ST. JEOR: SCORE: 1561.22

## 2018-12-10 NOTE — ANESTHESIA PREPROCEDURE EVALUATION
Anesthesia Pre-Procedure Evaluation    Patient: Liliana Poole   MRN: [id number] : 1970          Preoperative Diagnosis: ANKLE IMPINGEMENT     Procedure(s):  RIGHT ANKLE SCOPE AND OS TRIGONUM EXCISION^    Past Medical History:   Diagnosis Date     Abdominal pain     right sided, with nausea and weight loss     GERD (gastroesophageal reflux disease)      Hip pain      History of Helicobacter pylori infection      Hyperlipidaemia      Hypertension      Insomnia      Meniere's disease      Migraines      Motion sickness      Obese      Patent foramen ovale      PFO (patent foramen ovale)     SUKHWINDER 2010,echo 2008     PONV (postoperative nausea and vomiting)      Past Surgical History:   Procedure Laterality Date     ANKLE SURGERY       ARTHROSCOPY ANKLE, OPEN REPAIR LIGAMENT, COMBINED Right 10/3/2016    Procedure: COMBINED ARTHROSCOPY ANKLE, OPEN REPAIR LIGAMENT;  Surgeon: Mk Stephenson MD;  Location: [facility]     CHOLECYSTECTOMY       FINGER SURGERY      fx of right little finger     HIP SURGERY      labrel tear     LAPAROSCOPIC CHOLECYSTECTOMY       ROTATOR CUFF REPAIR RT/LT       ROTATOR CUFF REPAIR RT/LT       uterine ablation       varicose vein ablation         Anesthesia Evaluation     .             ROS/MED HX    ENT/Pulmonary:  - neg pulmonary ROS     Neurologic:  - neg neurologic ROS   (+)migraines,     Cardiovascular:     (+) hypertension----. : . . . :. .       METS/Exercise Tolerance:     Hematologic:         Musculoskeletal:         GI/Hepatic:     (+) GERD       Renal/Genitourinary:  - ROS Renal section negative       Endo:     (+) Obesity, .      Psychiatric:         Infectious Disease:         Malignancy:         Other:                          Physical Exam  Normal systems: cardiovascular, pulmonary and dental    Airway   Mallampati: II  TM distance: >3 FB  Neck ROM: full    Dental     Cardiovascular       Pulmonary             Lab Results   Component Value Date    HCG  "Negative 12/10/2018       Preop Vitals  BP Readings from Last 3 Encounters:   05/22/18 124/79   10/02/17 133/79   03/31/17 124/82    Pulse Readings from Last 3 Encounters:   05/22/18 70   12/06/17 74   11/01/17 85      Resp Readings from Last 3 Encounters:   12/06/17 18   11/01/17 18   10/03/16 16    SpO2 Readings from Last 3 Encounters:   05/22/18 99%   12/06/17 99%   11/01/17 99%      Temp Readings from Last 1 Encounters:   05/22/18 (!) 26.3  C (79.4  F) (Temporal)    Ht Readings from Last 1 Encounters:   10/03/16 1.651 m (5' 5\")      Wt Readings from Last 1 Encounters:   10/03/16 95.8 kg (211 lb 4.8 oz)    Estimated body mass index is 35.16 kg/m  as calculated from the following:    Height as of 10/3/16: 1.651 m (5' 5\").    Weight as of 10/3/16: 95.8 kg (211 lb 4.8 oz).       Anesthesia Plan      History & Physical Review      ASA Status:  2 .    NPO Status:  > 8 hours    Plan for General and LMA with Intravenous induction. Maintenance will be Balanced.    PONV prophylaxis:  Ondansetron (or other 5HT-3) and Dexamethasone or Solumedrol       Postoperative Care  Postoperative pain management:  IV analgesics.      Consents  Anesthetic plan, risks, benefits and alternatives discussed with:  Patient..                 Jarett Chavez, DO, DO  "

## 2018-12-10 NOTE — ANESTHESIA POSTPROCEDURE EVALUATION
Patient: Liliana Poole    Procedure(s):  RIGHT ANKLE SCOPE AND OS TRIGONUM EXCISION    Diagnosis:ANKLE IMPINGEMENT   Diagnosis Additional Information: No value filed.    Anesthesia Type:  General, LMA    Note:  Anesthesia Post Evaluation    Patient location during evaluation: bedside  Patient participation: Able to fully participate in evaluation  Level of consciousness: awake  Pain management: adequate  Airway patency: patent  Cardiovascular status: acceptable  Respiratory status: acceptable  Hydration status: acceptable  PONV: none     Anesthetic complications: None    Comments: No anesthetic complications noted.         Last vitals:  Vitals:    12/10/18 1200 12/10/18 1215 12/10/18 1252   BP:  99/68 124/74   Pulse:   54   Resp: 16 16    Temp:  36.1  C (97  F) 36.1  C (97  F)   SpO2:  97% 98%         Electronically Signed By: Jarett Chavez DO, DO  December 10, 2018  2:55 PM

## 2018-12-10 NOTE — ANESTHESIA CARE TRANSFER NOTE
Patient: Liliana Poole    Procedure(s):  RIGHT ANKLE SCOPE AND OS TRIGONUM EXCISION    Diagnosis: ANKLE IMPINGEMENT   Diagnosis Additional Information: No value filed.    Anesthesia Type:   General, LMA     Note:  Airway :LMA and Face Mask  Patient transferred to:PACU  Handoff Report: Identifed the Patient, Identified the Reponsible Provider, Reviewed the pertinent medical history, Discussed the surgical course, Reviewed Intra-OP anesthesia mangement and issues during anesthesia, Set expectations for post-procedure period and Allowed opportunity for questions and acknowledgement of understanding  LMA remains, good air exchange, report to RN    Vitals: (Last set prior to Anesthesia Care Transfer)    CRNA VITALS  12/10/2018 1027 - 12/10/2018 1104      12/10/2018             Resp Rate (set):  10          BP: 104/61  P: 63  SAO2:99%      Electronically Signed By: CHIP Betancourt CRNA  December 10, 2018  11:04 AM

## 2018-12-10 NOTE — DISCHARGE INSTRUCTIONS
Same Day Surgery Discharge Instructions for  Sedation and General Anesthesia       It's not unusual to feel dizzy, light-headed or faint for up to 24 hours after surgery or while taking pain medication.  If you have these symptoms: sit for a few minutes before standing and have someone assist you when you get up to walk or use the bathroom.      You should rest and relax for the next 24 hours. We recommend you make arrangements to have an adult stay with you for at least 24 hours after your discharge.  Avoid hazardous and strenuous activity.      DO NOT DRIVE any vehicle or operate mechanical equipment for 24 hours following the end of your surgery.  Even though you may feel normal, your reactions may be affected by the medication you have received.      Do not drink alcoholic beverages for 24 hours following surgery.       Slowly progress to your regular diet as you feel able. It's not unusual to feel nauseated and/or vomit after receiving anesthesia.  If you develop these symptoms, drink clear liquids (apple juice, ginger ale, broth, 7-up, etc. ) until you feel better.  If your nausea and vomiting persists for 24 hours, please notify your surgeon.        All narcotic pain medications, along with inactivity and anesthesia, can cause constipation. Drinking plenty of liquids and increasing fiber intake will help.      For any questions of a medical nature, call your surgeon.      Do not make important decisions for 24 hours.      If you had general anesthesia, you may have a sore throat for a couple of days related to the breathing tube used during surgery.  You may use Cepacol lozenges to help with this discomfort.  If it worsens or if you develop a fever, contact your surgeon.       If you feel your pain is not well managed with the pain medications prescribed by your surgeon, please contact your surgeon's office to let them know so they can address your concerns.     POST-OPERATIVE INSTRUCTIONS  FOOT AND ANKLE  SURGERY  ANUSHA Stephenson M.D.  Deejay Soni P.A.-C    These precautions MUST be followed for the first 24 hours after surgery:    Upon discharge, go directly home.    You must make arrangements to have a responsible adult stay with you.    DO NOT DRINK ALCOHOLIC BEVERAGES    It is not unusual to feel lightheaded up to 24 hours after surgery or while taking pain medication.  If you feel lightheaded, sit for a few minutes before standing and have someone assisted you when you get up to walk or use the restroom.    Do not use any mechanical equipment.    Do not make any important or legal decisions for 24 hours or while on pain medications.    You may experience dry mouth, sore throat, or sleep disturbances from the anesthesia and medications used during surgery.  Generalized muscle aches can sometimes occur.  These usually disappear in 12-24 hours.    POST-OPERATIVE CARE GUIDELINES    The following are general guidelines about what to expect the first days/weeks after surgery.  They are not specific, and your recovery may be slightly different.    Blood clots are not common, but are emergencies.  If you have sudden onset pain in your calf or leg, or have sudden shortness of breath, go to the Emergency Department.      Elevation of your operative foot/ankle is key to reducing the swelling in the immediate post-operative phase (first 3-5 days).  When you are at rest, elevate your foot at or above the level of your heart.  When sitting, your foot should be elevated on a chair or stool; not hanging down.      You should plan to rest the day after surgery no matter how minor the procedure.  More complicated procedures will require more time to return to normal activity.      Foot and ankle surgery is painful in most cases.   It is not unusual for the pain to be worse a day or two after surgery than on the day of.  If your pain is more than 8/10 contact our office.  If you don t have pain, gradually decrease your pain  meds by substituting Tylenol.  Please don t use Advil/ibuprofen unless we order it for you.      You will be prescribed Percocet or Vicodin for pain, Vistaril for spasms/pain adjunct, Senokot for constipation.  If you have had trouble taking these meds before or experience nausea or vomiting after surgery from your medication, please advise the recovery room nurses.  If you are already at home, try drinking only clear liquids and/or call our office.      All pain medications, along with inactivity can cause constipation.  Use the Senakot as directed, increase fluid intake to 1 quart per day and increase your dietary fiber.  (The  P  fruits - peaches, plums, pears, and prunes as well as anise/black licorice are recommended.)    It is not unusual to run a low-grade fever after surgery.  If your temperature is elevated above 102 , lasts longer than 24 hours, or is questionable in any way, contact our office.      Drainage from your cast/dressing is normal.  Reinforce your cast/dressing with 4x4 dressings and cover with an Ace wrap.  Wait until 24 hours after surgery to change your dressings; by this time most of your bleeding will have stopped.  If you have drainage through your dressings 2 days after surgery, contact our office.      You cast/dressing will be changed at your 2-week follow up appointment.  They should be kept clean and DRY.  If your cast/dressing is damaged before that, contact our office.      It is normal to experience some bruising in the toes after surgery and they may appear  dark  when your foot hangs down.  It is important to actively wiggle your toes for at least 5-10 minutes each hour UNLESS you had surgery on those toes.      Use ice on your foot/ankle over the dressing/cast for 20 minutes per hour, 10 or more times per day.  A large bag of frozen peas works well.      Bathing: take a tub or sponge bath instead of a shower if possible during the first two weeks.  If you choose to shower,  cover the dressing/cast with a waterproof covering, these may be ordered from www.seal-tight.com or are available at some pharmacies/medical supply stores.  Another option is XeroSox, which is the original vacuum sealed bandage and cast cover.  The cast cover has a vacuum seal and is absolutely waterproof.  6-717-713-0338 or www.xerosox.SingOn for more information.      Driving:  For surgery on the left foot/ankle, you may drive as soon as narcotic medications are stopped.  For surgery on the right foot/ankle, you may drive when you are out of a cast, off pain medications, and you feel secure with braking.      In general, listen to your foot/ankle.  If you have a sudden, dramatic increase in pain that does not resolve after an hour or so, something is wrong - call our office.  If you fall or bump your foot/ankle and have sudden pain that resolves, give it 24 hours before you call.      Many of your questions can be addressed at your 2-week follow-up appointment - please make a list of things to ask us as they come up during your recovery.      If you had a nerve block it is common to have numbness in your leg and foot for up to 30 hours after surgery.  If your leg or foot is still numb more than 30 hours after surgery, please call the office.      FUTURE DENTIST VISITS:  If you have had a total ankle arthroplasty please be advised you must be on antibiotics prior to ANY dental work.  Please call your dentist office ahead of time and make them aware of this as your dentist will be able to order the prescription.  If you have had any other surgery this is not a concern.    If you have any further questions or concerns, please call our office at [phone]    **If you have questions or concerns about your procedure,   call Dr. Stephenson at [phone]**      Information for Patients Discharging with a Transderm Scopolamine Patch       Dry mouth is a common side effect.    Drowsiness is another common side effect  especially when combined with pain medication.  Please avoid activities that require mental alertness such as driving a car or making important legal decisions.    Since Scopolamine can cause temporary dilation of the pupils and blurred vision if it comes in contact with the eyes; be sure to wash your hands thoroughly with soap and water immediately after handling the patch.   When you remove your patch, please stick it to a tissue or paper towel for disposal.      Remove the patch immediately and contact a physician in the unlikely event that you experience symptoms of acute glaucoma (pain and reddening of the eyes, accompanied by dilated pupils).    Remove the patch if you develop any difficulties urinating.  If you cannot urinate after removing your patch, please notify your surgeon.    Remove the patch 24 hours after surgery.

## 2018-12-11 NOTE — OP NOTE
Procedure Date: 12/10/2018      PREOPERATIVE DIAGNOSES:   1.  Anterior impingement, right ankle after previous Brostrom repair.    2.  Os trigonum posterior aspect right ankle with posterior impingement of the ankle.      POSTOPERATIVE DIAGNOSES:   1.  Anterior impingement, right ankle after previous Brostrom repair.    2.  Os trigonum posterior aspect right ankle with posterior impingement of the ankle.      PROCEDURES:   1.  Arthroscopy and arthroscopic debridement of the right ankle.   2.  Open os trigonum excision and posterior ankle debridement.      ANESTHESIA:  General.      SURGEON:  Mk Stephenson MD.      ASSISTANT:  CAMILLA Alvarado.      PREAMBLE:  Ms. Poole presented with anterior and posterior impingement of her right ankle.  She has os trigonum that is impinging on the back.  She previously had a Brostrom repair done and there is some scar tissue in the front of her ankle per her recent MRI.        Informed consent was obtained for the above-mentioned procedure.      Two grams of Ancef was given prior to surgery.  There is no need for postoperative antibiotic prophylaxis.      DESCRIPTION OF PROCEDURE:  After adequate induction of a general anesthetic, the patient was positioned supine on the operating table.  The right leg was sterilely prepped and free draped in the usual fashion.  Tourniquet around the thigh was inflated to 300 mmHg.      A standard arthroscopy was done with medial and lateral portals.  The scope was first inserted through the medial portal.  There was moderate synovial hypertrophy and the shaver was used to remove all the excess tissue.  The lateral ligament repair was stable.      There was a small central defect in the tibia that was debrided.  There was also a little bit of articular cartilage left over the lateral talar dome that was debrided.      The scope was then removed.  This was followed by a 3 cm incision posteromedial over the ankle.  The interval between  the neurovascular bundle and FDL was used to expose the posterior aspect of the ankle.  The FHL was exposed and a tenolysis done.  The FHL was then retracted to expose the posterior aspect of the ankle.  An osteotome was used to separate the os trigonum from the posterior aspect of the joint and it was then removed.  Sharp dissection was used to remove all of the abnormal tissue from the posterior aspect of the ankle.      The ankle and subtalar joints looked normal.  After the debridement, the tourniquet was deflated.  The wounds were closed with nylon sutures.  A sterile dressing and a light compressive bandage were applied.  She tolerated the procedure well and was taken to the recovery room in satisfactory condition.      She can ambulate weightbearing as tolerated.  Sutures will be removed in 2 weeks.         SHARON THOMAS MD             D: 12/10/2018   T: 12/10/2018   MT: PANCHO      Name:     KIT SEVILLA   MRN:      [id number]        Account:        [id number]   :      1970           Procedure Date: 12/10/2018      Document: [id number]

## 2019-01-16 ENCOUNTER — THERAPY VISIT (OUTPATIENT)
Dept: PHYSICAL THERAPY | Facility: CLINIC | Age: 49
End: 2019-01-16
Payer: COMMERCIAL

## 2019-01-16 DIAGNOSIS — M25.571 PAIN IN JOINT INVOLVING ANKLE AND FOOT, RIGHT: ICD-10-CM

## 2019-01-16 PROCEDURE — 97110 THERAPEUTIC EXERCISES: CPT | Mod: GP | Performed by: PHYSICAL THERAPIST

## 2019-01-16 PROCEDURE — 97161 PT EVAL LOW COMPLEX 20 MIN: CPT | Mod: GP | Performed by: PHYSICAL THERAPIST

## 2019-01-16 ASSESSMENT — ACTIVITIES OF DAILY LIVING (ADL)
ACTIVITIES_OF_DAILY_LIVING_MEASURE_SCORE(%):: 66.67
TOTAL_ADL_ITEM_SCORE:: 56
HIGHEST_POTENTIAL_ADL_SCORE:: 84

## 2019-01-16 NOTE — PROGRESS NOTES
[facility] Initial Evaluation  Subjective:  Pt presents post op following excision os trigonum, ankle debridement. Surgery performed on 12-10-18. 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 Injury in June of 2017, progressively got worse and had surgery performed. Pt currently rating pain in the ankle at 6/10 level. Pt primary pain is in the medial aspect of the ankle, posterior to the malleolus as well as over the anterior tibialis. Pt with referral dated 1-15-19 for evaluate and treat, ROM, scar work, strengthening       The history is provided by the patient. No  was used.   Liliana Poole is a 48 year old female with a right ankle and right foot condition.  Condition occurred with:  Contact with object and repetition/overuse.  Condition occurred: at home.  This is a chronic condition     Patient reports pain:  Anterior, medial and lateral.  Radiates to:  Ankle, foot and lower leg.  Pain is described as aching and burning and is constant and reported as 6/10.  Associated symptoms:  Loss of motion/stiffness and loss of strength. Pain is worse in the P.M..  Symptoms are exacerbated by walking, standing, ascending stairs, descending stairs, transfers and bending/squatting and relieved by rest and NSAID's.  Since onset symptoms are gradually improving.  Special tests:  MRI (pre op ).  Previous treatment includes surgery.  There was moderate improvement following previous treatment.  General health as reported by patient is good.  Pertinent medical history includes:  None.  Medical allergies: yes.  Other surgeries include:  Orthopedic surgery.  Current medications:  None as reported by patient.  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  Primary job tasks include:  Lifting, prolonged standing, repetitive tasks and prolonged sitting.    Barriers include:   None as reported by patient.    Red flags:  None as reported by patient.                        Objective:    Gait:  Limp and list on the RLE  Gait Type:  Antalgic   Weight Bearing Status:  WBAT   Assistive Devices:  None            Ankle/Foot Evaluation  ROM:    AROM:    Dorsiflexion:  Left:   20  Right:   13  Plantarflexion:  Left:  62    Right:  41  Inversion:  Left:  45     Right:  30  Eversion:  12     Right:  12        Strength:    Dorsiflexion:  Right: 4+/5   Pain:  Plantarflexion: Left: 5/5   Pain:   Right: 4+/5  Pain:  Inversion:Left: 5/5  Pain:     Right: 5/5  Pain:  Eversion:Left: 5/5  Pain:  Right: 5/5  Pain:                  LIGAMENT TESTING: normal                PALPATION:     Right ankle tenderness present at:   anterior tibialis; incisional; medial calcaneal and medial malleolus  Right ankle tenderness not present at:  gastroc/soleus; achilles tendon; deltoid ligament; plantar fascia; anterior talofibular ligament; posterior talofibular ligament; calcaneofibular ligament or lateral malleolus  EDEMA: Edema ankle: General edema ad swelling about the medial and lateral mallleolus           MOBILITY TESTING: normal                                                                General     ROS    Assessment/Plan:    Patient is a 48 year old female with right side ankle complaints.    Patient has the following significant findings with corresponding treatment plan.                Diagnosis 1:  R ankle pain s/p debridement, os trigonum excision   Pain -  hot/cold therapy, manual therapy, self management, education, directional preference exercise and home program  Decreased ROM/flexibility - manual therapy, therapeutic exercise and home program  Decreased strength - therapeutic exercise, therapeutic activities and home program  Impaired balance - neuro re-education, therapeutic activities and home program  Inflammation - cold therapy and self management/home program  Impaired gait - gait training and home  program  Impaired muscle performance - neuro re-education and home program  Decreased function - therapeutic activities and home program    Therapy Evaluation Codes:   1) History comprised of:   Personal factors that impact the plan of care:      Profession and Time since onset of symptoms.    Comorbidity factors that impact the plan of care are:      Overweight.     Medications impacting care: None.  2) Examination of Body Systems comprised of:   Body structures and functions that impact the plan of care:      Ankle.   Activity limitations that impact the plan of care are:      Jumping, Lifting, Running, Squatting/kneeling, Stairs, Standing and Walking.  3) Clinical presentation characteristics are:   Stable/Uncomplicated.  4) Decision-Making    Low complexity using standardized patient assessment instrument and/or measureable assessment of functional outcome.  Cumulative Therapy Evaluation is: Low complexity.    Previous and current functional limitations:  (See Goal Flow Sheet for this information)    Short term and Long term goals: (See Goal Flow Sheet for this information)     Communication ability:  Patient appears to be able to clearly communicate and understand verbal and written communication and follow directions correctly.  Treatment Explanation - The following has been discussed with the patient:   RX ordered/plan of care  Anticipated outcomes  Possible risks and side effects  This patient would benefit from PT intervention to resume normal activities.   Rehab potential is good.    Frequency:  2 X week, once daily  Duration:  for 2 weeks tapering to 1 X a week over 8 weeks  Discharge Plan:  Achieve all LTG.  Independent in home treatment program.  Reach maximal therapeutic benefit.    Please refer to the daily flowsheet for treatment today, total treatment time and time spent performing 1:1 timed codes.

## 2019-01-16 NOTE — LETTER
[facility] ATHLETIC [facility] PHYSICAL [facility]  [address]  [facility] [id number] [phone]    2019    Re: Liliana Poole   :   1970  MRN:  [id number]   REFERRING PHYSICIAN:   Mk Stephenson    [facility] ATHLETIC [facility]    Date of Initial Evaluation: 19  Visits:  Rxs Used: 1  Reason for Referral:  Pain in joint involving ankle and foot, right    EVALUATION SUMMARY    [facility] Athletic [facility] Initial Evaluation  Subjective:  Pt presents post op following excision os trigonum, ankle debridement. Surgery performed on 12-10-18. 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 Injury in 2017, progressively got worse and had surgery performed. Pt currently rating pain in the ankle at 6/10 level. Pt primary pain is in the medial aspect of the ankle, posterior to the malleolus as well as over the anterior tibialis. Pt with referral dated 1-15-19 for evaluate and treat, ROM, scar work, strengthening     The history is provided by the patient. No  was used.   Liliana Poole is a 48 year old female with a right ankle and right foot condition.  Condition occurred with:  Contact with object and repetition/overuse.  Condition occurred: at home.  This is a chronic condition     Patient reports pain:  Anterior, medial and lateral.  Radiates to:  Ankle, foot and lower leg.  Pain is described as aching and burning and is constant and reported as 6/10.  Associated symptoms:  Loss of motion/stiffness and loss of strength. Pain is worse in the P.M..  Symptoms are exacerbated by walking, standing, ascending stairs, descending stairs, transfers and bending/squatting and relieved by rest and NSAID's.  Since onset symptoms are gradually improving.  Special tests:  MRI (pre op ).  Previous treatment includes surgery.  There  was moderate improvement following previous treatment.  General health as reported by patient is good.  Pertinent medical history includes:  None.  Medical allergies: yes.  Other surgeries include:  Orthopedic surgery.  Current medications:  None as reported by patient.  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  Primary job tasks include:  Lifting, prolonged standing, repetitive tasks and prolonged sitting.    Barriers include:  None as reported by patient.    Red flags:  None as reported by patient.                 Objective:    Gait:  Limp and list on the RLE  Gait Type:  Antalgic   Weight Bearing Status:  WBAT   Assistive Devices:  None  Ankle/Foot Evaluation  ROM:    AROM:    Dorsiflexion:  Left:   20  Right:   13  Plantarflexion:  Left:  62    Right:  41  Inversion:  Left:  45     Right:  30  Eversion:  12     Right:  12    Strength:    Dorsiflexion:  Right: 4+/5   Pain:  Plantarflexion: Left: 5/5   Pain:   Right: 4+/5  Pain:  Inversion:Left: 5/5  Pain:     Right: 5/5  Pain:  Eversion:Left: 5/5  Pain:  Right: 5/5  Pain:    LIGAMENT TESTING: normal    PALPATION:   Right ankle tenderness present at:   anterior tibialis; incisional; medial calcaneal and medial malleolus  Right ankle tenderness not present at:  gastroc/soleus; achilles tendon; deltoid ligament; plantar fascia; anterior talofibular ligament; posterior talofibular ligament; calcaneofibular ligament or lateral malleolus    EDEMA: Edema ankle: General edema ad swelling about the medial and lateral mallleolus         MOBILITY TESTING: normal    Assessment/Plan:    Patient is a 48 year old female with right side ankle complaints.    Patient has the following significant findings with corresponding treatment plan.                Diagnosis 1:  R ankle pain s/p debridement, os trigonum excision   Pain -  hot/cold therapy, manual therapy, self management, education, directional preference exercise and home  program  Decreased ROM/flexibility - manual therapy, therapeutic exercise and home program  Decreased strength - therapeutic exercise, therapeutic activities and home program  Impaired balance - neuro re-education, therapeutic activities and home program  Inflammation - cold therapy and self management/home program  Impaired gait - gait training and home program  Impaired muscle performance - neuro re-education and home program  Decreased function - therapeutic activities and home program    Therapy Evaluation Codes:   1) History comprised of:   Personal factors that impact the plan of care:      Profession and Time since onset of symptoms.    Comorbidity factors that impact the plan of care are:      Overweight.     Medications impacting care: None.  2) Examination of Body Systems comprised of:   Body structures and functions that impact the plan of care:      Ankle.   Activity limitations that impact the plan of care are:      Jumping, Lifting, Running, Squatting/kneeling, Stairs, Standing and Walking.  3) Clinical presentation characteristics are:   Stable/Uncomplicated.  4) Decision-Making    Low complexity using standardized patient assessment instrument and/or measureable assessment of functional outcome.  Cumulative Therapy Evaluation is: Low complexity.    Previous and current functional limitations:  (See Goal Flow Sheet for this information)    Short term and Long term goals: (See Goal Flow Sheet for this information)     Communication ability:  Patient appears to be able to clearly communicate and understand verbal and written communication and follow directions correctly.  Treatment Explanation - The following has been discussed with the patient:   RX ordered/plan of care  Anticipated outcomes  Possible risks and side effects  This patient would benefit from PT intervention to resume normal activities.   Rehab potential is good.    Frequency:  2 X week, once daily  Duration:  for 2 weeks tapering to 1 X  a week over 8 weeks  Discharge Plan:  Achieve all LTG.  Independent in home treatment program.  Reach maximal therapeutic benefit.      Thank you for your referral.    INQUIRIES  Therapist: Kayode Varela   [facility]  [address] [place] [address] [address]  [facility] [address]  Phone: [phone]  Fax: [phone]

## 2019-01-23 ENCOUNTER — THERAPY VISIT (OUTPATIENT)
Dept: PHYSICAL THERAPY | Facility: CLINIC | Age: 49
End: 2019-01-23
Payer: COMMERCIAL

## 2019-01-23 DIAGNOSIS — M25.571 PAIN IN JOINT INVOLVING ANKLE AND FOOT, RIGHT: ICD-10-CM

## 2019-01-23 PROCEDURE — 97110 THERAPEUTIC EXERCISES: CPT | Mod: GP | Performed by: PHYSICAL THERAPIST

## 2019-01-23 PROCEDURE — 97112 NEUROMUSCULAR REEDUCATION: CPT | Mod: GP | Performed by: PHYSICAL THERAPIST

## 2019-01-23 PROCEDURE — 97140 MANUAL THERAPY 1/> REGIONS: CPT | Mod: GP | Performed by: PHYSICAL THERAPIST

## 2019-01-30 ENCOUNTER — THERAPY VISIT (OUTPATIENT)
Dept: PHYSICAL THERAPY | Facility: CLINIC | Age: 49
End: 2019-01-30
Payer: COMMERCIAL

## 2019-01-30 DIAGNOSIS — M25.571 PAIN IN JOINT INVOLVING ANKLE AND FOOT, RIGHT: ICD-10-CM

## 2019-01-30 PROCEDURE — 97112 NEUROMUSCULAR REEDUCATION: CPT | Mod: GP | Performed by: PHYSICAL THERAPIST

## 2019-01-30 PROCEDURE — 97140 MANUAL THERAPY 1/> REGIONS: CPT | Mod: GP | Performed by: PHYSICAL THERAPIST

## 2019-01-30 PROCEDURE — 97110 THERAPEUTIC EXERCISES: CPT | Mod: GP | Performed by: PHYSICAL THERAPIST

## 2019-01-30 ASSESSMENT — ACTIVITIES OF DAILY LIVING (ADL)
ACTIVITIES_OF_DAILY_LIVING_MEASURE_SCORE(%):: 90.48
TOTAL_ADL_ITEM_SCORE:: 76
HIGHEST_POTENTIAL_ADL_SCORE:: 84

## 2019-01-30 NOTE — PROGRESS NOTES
"Subjective:  HPI                    Objective:  System    Physical Exam    General     ROS    Assessment/Plan:    DISCHARGE REPORT    Progress reporting period is from 1-16-19 to 1-30-19.       SUBJECTIVE  Subjective: Pt reports her ankle is doing well, feels there is some stiffness and \"fullness\" in the ankle. Gets sore by the end of the day. Pain rated at 2/10 level    Current pain level is 2/10 Current Pain level: 2/10.     Previous pain level was  4/10 Initial Pain level: 6/10.   Changes in function:  Yes (See Goal flowsheet attached for changes in current functional level)  Adverse reaction to treatment or activity: None    OBJECTIVE  Changes noted in objective findings:    Objective: AROM full, DF to 20 degrees with knee straight. MMT at 5/5 DF, PF, IV, EV. Scars remain with minimal tightness, non tender to palpation. Gait without limp or list today. Pt feels comfortable with continuation of ther ex on own, will discharge with HEP.      ASSESSMENT/PLAN  Updated problem list and treatment plan: Diagnosis 1:  Post op R ankle  Pain -  home program  Decreased strength - home program  STG/LTGs have been met or progress has been made towards goals:  Yes (See Goal flow sheet completed today.)  Assessment of Progress: The patient's condition is improving.  Self Management Plans:  Patient has been instructed in a home treatment program.  Patient is independent in a home treatment program.  Patient  has been instructed in self management of symptoms.  Patient is independent in self management of symptoms.    Liliana continues to require the following intervention to meet STG and LTG's:  PT intervention is no longer required to meet STG/LTG.    Recommendations:  This patient is ready to be discharged from therapy and continue their home treatment program.    Please refer to the daily flowsheet for treatment today, total treatment time and time spent performing 1:1 timed codes.          "

## 2019-02-15 ENCOUNTER — HEALTH MAINTENANCE LETTER (OUTPATIENT)
Age: 49
End: 2019-02-15

## 2019-09-27 ENCOUNTER — HEALTH MAINTENANCE LETTER (OUTPATIENT)
Age: 49
End: 2019-09-27

## 2019-12-26 RX ORDER — HYDROXYZINE PAMOATE 25 MG/1
25-50 CAPSULE ORAL
COMMUNITY

## 2019-12-26 RX ORDER — DOXYCYCLINE 100 MG/1
100 TABLET ORAL 2 TIMES DAILY
Status: ON HOLD | COMMUNITY
End: 2019-12-27

## 2019-12-26 RX ORDER — SCOLOPAMINE TRANSDERMAL SYSTEM 1 MG/1
1 PATCH, EXTENDED RELEASE TRANSDERMAL
COMMUNITY

## 2019-12-26 RX ORDER — METHOCARBAMOL 750 MG/1
750 TABLET, FILM COATED ORAL EVERY 6 HOURS PRN
COMMUNITY

## 2019-12-27 ENCOUNTER — ANESTHESIA (OUTPATIENT)
Dept: SURGERY | Facility: CLINIC | Age: 49
End: 2019-12-27
Payer: COMMERCIAL

## 2019-12-27 ENCOUNTER — HOSPITAL ENCOUNTER (OUTPATIENT)
Facility: CLINIC | Age: 49
Discharge: HOME | End: 2019-12-27
Attending: ORTHOPAEDIC SURGERY | Admitting: ORTHOPAEDIC SURGERY
Payer: COMMERCIAL

## 2019-12-27 ENCOUNTER — ANESTHESIA EVENT (OUTPATIENT)
Dept: SURGERY | Facility: CLINIC | Age: 49
End: 2019-12-27
Payer: COMMERCIAL

## 2019-12-27 ENCOUNTER — APPOINTMENT (OUTPATIENT)
Dept: GENERAL RADIOLOGY | Facility: CLINIC | Age: 49
End: 2019-12-27
Attending: ORTHOPAEDIC SURGERY
Payer: COMMERCIAL

## 2019-12-27 VITALS
HEART RATE: 64 BPM | SYSTOLIC BLOOD PRESSURE: 112 MMHG | RESPIRATION RATE: 16 BRPM | TEMPERATURE: 97.7 F | OXYGEN SATURATION: 95 % | BODY MASS INDEX: 33.19 KG/M2 | HEIGHT: 65 IN | DIASTOLIC BLOOD PRESSURE: 67 MMHG | WEIGHT: 199.2 LBS

## 2019-12-27 DIAGNOSIS — M25.552 HIP PAIN, ACUTE, LEFT: Primary | ICD-10-CM

## 2019-12-27 LAB — HCG UR QL: NEGATIVE

## 2019-12-27 PROCEDURE — 40000277 XR SURGERY CARM FLUORO LESS THAN 5 MIN W STILLS

## 2019-12-27 PROCEDURE — 25000125 ZZHC RX 250: Performed by: ORTHOPAEDIC SURGERY

## 2019-12-27 PROCEDURE — 25000128 H RX IP 250 OP 636: Performed by: NURSE ANESTHETIST, CERTIFIED REGISTERED

## 2019-12-27 PROCEDURE — C1713 ANCHOR/SCREW BN/BN,TIS/BN: HCPCS | Performed by: ORTHOPAEDIC SURGERY

## 2019-12-27 PROCEDURE — 71000027 ZZH RECOVERY PHASE 2 EACH 15 MINS: Performed by: ORTHOPAEDIC SURGERY

## 2019-12-27 PROCEDURE — 25800030 ZZH RX IP 258 OP 636: Performed by: NURSE ANESTHETIST, CERTIFIED REGISTERED

## 2019-12-27 PROCEDURE — 25000128 H RX IP 250 OP 636: Performed by: ORTHOPAEDIC SURGERY

## 2019-12-27 PROCEDURE — 25800025 ZZH RX 258: Performed by: ORTHOPAEDIC SURGERY

## 2019-12-27 PROCEDURE — 36000063 ZZH SURGERY LEVEL 4 EA 15 ADDTL MIN: Performed by: ORTHOPAEDIC SURGERY

## 2019-12-27 PROCEDURE — 25000132 ZZH RX MED GY IP 250 OP 250 PS 637: Performed by: ANESTHESIOLOGY

## 2019-12-27 PROCEDURE — 37000008 ZZH ANESTHESIA TECHNICAL FEE, 1ST 30 MIN: Performed by: ORTHOPAEDIC SURGERY

## 2019-12-27 PROCEDURE — 71000012 ZZH RECOVERY PHASE 1 LEVEL 1 FIRST HR: Performed by: ORTHOPAEDIC SURGERY

## 2019-12-27 PROCEDURE — 81025 URINE PREGNANCY TEST: CPT | Performed by: ORTHOPAEDIC SURGERY

## 2019-12-27 PROCEDURE — 37000009 ZZH ANESTHESIA TECHNICAL FEE, EACH ADDTL 15 MIN: Performed by: ORTHOPAEDIC SURGERY

## 2019-12-27 PROCEDURE — 36000065 ZZH SURGERY LEVEL 4 W FLUORO 1ST 30 MIN: Performed by: ORTHOPAEDIC SURGERY

## 2019-12-27 PROCEDURE — 25000128 H RX IP 250 OP 636: Performed by: ANESTHESIOLOGY

## 2019-12-27 PROCEDURE — 27210794 ZZH OR GENERAL SUPPLY STERILE: Performed by: ORTHOPAEDIC SURGERY

## 2019-12-27 PROCEDURE — 40000170 ZZH STATISTIC PRE-PROCEDURE ASSESSMENT II: Performed by: ORTHOPAEDIC SURGERY

## 2019-12-27 PROCEDURE — 71000013 ZZH RECOVERY PHASE 1 LEVEL 1 EA ADDTL HR: Performed by: ORTHOPAEDIC SURGERY

## 2019-12-27 PROCEDURE — 25800030 ZZH RX IP 258 OP 636: Performed by: ANESTHESIOLOGY

## 2019-12-27 PROCEDURE — 25000125 ZZHC RX 250: Performed by: NURSE ANESTHETIST, CERTIFIED REGISTERED

## 2019-12-27 DEVICE — IMP ANCHOR BIORAPT 2.3MM S&N 72201542: Type: IMPLANTABLE DEVICE | Site: HIP | Status: FUNCTIONAL

## 2019-12-27 RX ORDER — ONDANSETRON 2 MG/ML
INJECTION INTRAMUSCULAR; INTRAVENOUS PRN
Status: DISCONTINUED | OUTPATIENT
Start: 2019-12-27 | End: 2019-12-27

## 2019-12-27 RX ORDER — ONDANSETRON 4 MG/1
4 TABLET, ORALLY DISINTEGRATING ORAL EVERY 30 MIN PRN
Status: DISCONTINUED | OUTPATIENT
Start: 2019-12-27 | End: 2019-12-27 | Stop reason: HOSPADM

## 2019-12-27 RX ORDER — HYDROMORPHONE HYDROCHLORIDE 1 MG/ML
.3-.5 INJECTION, SOLUTION INTRAMUSCULAR; INTRAVENOUS; SUBCUTANEOUS EVERY 10 MIN PRN
Status: DISCONTINUED | OUTPATIENT
Start: 2019-12-27 | End: 2019-12-27 | Stop reason: HOSPADM

## 2019-12-27 RX ORDER — HYDROXYZINE HYDROCHLORIDE 25 MG/1
25 TABLET, FILM COATED ORAL ONCE
Status: COMPLETED | OUTPATIENT
Start: 2019-12-27 | End: 2019-12-27

## 2019-12-27 RX ORDER — SODIUM CHLORIDE, SODIUM LACTATE, POTASSIUM CHLORIDE, CALCIUM CHLORIDE 600; 310; 30; 20 MG/100ML; MG/100ML; MG/100ML; MG/100ML
INJECTION, SOLUTION INTRAVENOUS CONTINUOUS
Status: DISCONTINUED | OUTPATIENT
Start: 2019-12-27 | End: 2019-12-27 | Stop reason: HOSPADM

## 2019-12-27 RX ORDER — FENTANYL CITRATE 50 UG/ML
INJECTION, SOLUTION INTRAMUSCULAR; INTRAVENOUS PRN
Status: DISCONTINUED | OUTPATIENT
Start: 2019-12-27 | End: 2019-12-27

## 2019-12-27 RX ORDER — PROPOFOL 10 MG/ML
INJECTION, EMULSION INTRAVENOUS PRN
Status: DISCONTINUED | OUTPATIENT
Start: 2019-12-27 | End: 2019-12-27

## 2019-12-27 RX ORDER — CLINDAMYCIN PHOSPHATE 900 MG/50ML
900 INJECTION, SOLUTION INTRAVENOUS
Status: DISCONTINUED | OUTPATIENT
Start: 2019-12-27 | End: 2019-12-27 | Stop reason: HOSPADM

## 2019-12-27 RX ORDER — ONDANSETRON 2 MG/ML
4 INJECTION INTRAMUSCULAR; INTRAVENOUS EVERY 30 MIN PRN
Status: DISCONTINUED | OUTPATIENT
Start: 2019-12-27 | End: 2019-12-27 | Stop reason: HOSPADM

## 2019-12-27 RX ORDER — PROPOFOL 10 MG/ML
INJECTION, EMULSION INTRAVENOUS CONTINUOUS PRN
Status: DISCONTINUED | OUTPATIENT
Start: 2019-12-27 | End: 2019-12-27

## 2019-12-27 RX ORDER — CELECOXIB 200 MG/1
200 CAPSULE ORAL 2 TIMES DAILY
Qty: 42 CAPSULE | Refills: 0 | Status: SHIPPED | OUTPATIENT
Start: 2019-12-27 | End: 2020-01-17

## 2019-12-27 RX ORDER — FENTANYL CITRATE 50 UG/ML
25-50 INJECTION, SOLUTION INTRAMUSCULAR; INTRAVENOUS EVERY 5 MIN PRN
Status: DISCONTINUED | OUTPATIENT
Start: 2019-12-27 | End: 2019-12-27 | Stop reason: HOSPADM

## 2019-12-27 RX ORDER — OXYCODONE HYDROCHLORIDE 5 MG/1
5 TABLET ORAL
Status: DISCONTINUED | OUTPATIENT
Start: 2019-12-27 | End: 2019-12-27 | Stop reason: HOSPADM

## 2019-12-27 RX ORDER — OXYCODONE AND ACETAMINOPHEN 5; 325 MG/1; MG/1
TABLET ORAL
Qty: 50 TABLET | Refills: 0 | Status: SHIPPED | OUTPATIENT
Start: 2019-12-27 | End: 2022-10-25

## 2019-12-27 RX ORDER — BUPIVACAINE HYDROCHLORIDE AND EPINEPHRINE 2.5; 5 MG/ML; UG/ML
INJECTION, SOLUTION EPIDURAL; INFILTRATION; INTRACAUDAL; PERINEURAL
Status: DISCONTINUED
Start: 2019-12-27 | End: 2019-12-27 | Stop reason: HOSPADM

## 2019-12-27 RX ORDER — CLINDAMYCIN PHOSPHATE 900 MG/50ML
900 INJECTION, SOLUTION INTRAVENOUS SEE ADMIN INSTRUCTIONS
Status: DISCONTINUED | OUTPATIENT
Start: 2019-12-27 | End: 2019-12-27 | Stop reason: HOSPADM

## 2019-12-27 RX ORDER — BUPIVACAINE HYDROCHLORIDE AND EPINEPHRINE 2.5; 5 MG/ML; UG/ML
INJECTION, SOLUTION INFILTRATION; PERINEURAL PRN
Status: DISCONTINUED | OUTPATIENT
Start: 2019-12-27 | End: 2019-12-27 | Stop reason: HOSPADM

## 2019-12-27 RX ORDER — DEXAMETHASONE SODIUM PHOSPHATE 4 MG/ML
INJECTION, SOLUTION INTRA-ARTICULAR; INTRALESIONAL; INTRAMUSCULAR; INTRAVENOUS; SOFT TISSUE PRN
Status: DISCONTINUED | OUTPATIENT
Start: 2019-12-27 | End: 2019-12-27

## 2019-12-27 RX ORDER — LIDOCAINE HYDROCHLORIDE 20 MG/ML
INJECTION, SOLUTION INFILTRATION; PERINEURAL PRN
Status: DISCONTINUED | OUTPATIENT
Start: 2019-12-27 | End: 2019-12-27

## 2019-12-27 RX ORDER — SODIUM CHLORIDE, SODIUM LACTATE, POTASSIUM CHLORIDE, CALCIUM CHLORIDE 600; 310; 30; 20 MG/100ML; MG/100ML; MG/100ML; MG/100ML
INJECTION, SOLUTION INTRAVENOUS CONTINUOUS PRN
Status: DISCONTINUED | OUTPATIENT
Start: 2019-12-27 | End: 2019-12-27

## 2019-12-27 RX ORDER — NALOXONE HYDROCHLORIDE 0.4 MG/ML
.1-.4 INJECTION, SOLUTION INTRAMUSCULAR; INTRAVENOUS; SUBCUTANEOUS
Status: DISCONTINUED | OUTPATIENT
Start: 2019-12-27 | End: 2019-12-27 | Stop reason: HOSPADM

## 2019-12-27 RX ORDER — SCOLOPAMINE TRANSDERMAL SYSTEM 1 MG/1
1 PATCH, EXTENDED RELEASE TRANSDERMAL ONCE
Status: DISCONTINUED | OUTPATIENT
Start: 2019-12-27 | End: 2019-12-27 | Stop reason: HOSPADM

## 2019-12-27 RX ADMIN — HYDROXYZINE HYDROCHLORIDE 25 MG: 25 TABLET, FILM COATED ORAL at 06:58

## 2019-12-27 RX ADMIN — HYDROMORPHONE HYDROCHLORIDE 0.5 MG: 1 INJECTION, SOLUTION INTRAMUSCULAR; INTRAVENOUS; SUBCUTANEOUS at 10:04

## 2019-12-27 RX ADMIN — PROPOFOL: 10 INJECTION, EMULSION INTRAVENOUS at 08:57

## 2019-12-27 RX ADMIN — PROPOFOL 200 MG: 10 INJECTION, EMULSION INTRAVENOUS at 07:45

## 2019-12-27 RX ADMIN — FENTANYL CITRATE 50 MCG: 0.05 INJECTION, SOLUTION INTRAMUSCULAR; INTRAVENOUS at 09:35

## 2019-12-27 RX ADMIN — SODIUM CHLORIDE, POTASSIUM CHLORIDE, SODIUM LACTATE AND CALCIUM CHLORIDE: 600; 310; 30; 20 INJECTION, SOLUTION INTRAVENOUS at 07:43

## 2019-12-27 RX ADMIN — ONDANSETRON 4 MG: 2 INJECTION INTRAMUSCULAR; INTRAVENOUS at 11:28

## 2019-12-27 RX ADMIN — SODIUM CHLORIDE, POTASSIUM CHLORIDE, SODIUM LACTATE AND CALCIUM CHLORIDE: 600; 310; 30; 20 INJECTION, SOLUTION INTRAVENOUS at 09:41

## 2019-12-27 RX ADMIN — LIDOCAINE HYDROCHLORIDE 100 MG: 20 INJECTION, SOLUTION INFILTRATION; PERINEURAL at 07:45

## 2019-12-27 RX ADMIN — FENTANYL CITRATE 25 MCG: 50 INJECTION, SOLUTION INTRAMUSCULAR; INTRAVENOUS at 08:28

## 2019-12-27 RX ADMIN — HYDROMORPHONE HYDROCHLORIDE 0.5 MG: 1 INJECTION, SOLUTION INTRAMUSCULAR; INTRAVENOUS; SUBCUTANEOUS at 07:52

## 2019-12-27 RX ADMIN — CLINDAMYCIN PHOSPHATE 900 MG: 900 INJECTION, SOLUTION INTRAVENOUS at 07:54

## 2019-12-27 RX ADMIN — MIDAZOLAM 2 MG: 1 INJECTION INTRAMUSCULAR; INTRAVENOUS at 07:44

## 2019-12-27 RX ADMIN — ONDANSETRON 4 MG: 2 INJECTION INTRAMUSCULAR; INTRAVENOUS at 08:07

## 2019-12-27 RX ADMIN — FENTANYL CITRATE 25 MCG: 50 INJECTION, SOLUTION INTRAMUSCULAR; INTRAVENOUS at 08:05

## 2019-12-27 RX ADMIN — DEXAMETHASONE SODIUM PHOSPHATE 4 MG: 4 INJECTION, SOLUTION INTRA-ARTICULAR; INTRALESIONAL; INTRAMUSCULAR; INTRAVENOUS; SOFT TISSUE at 08:07

## 2019-12-27 RX ADMIN — PHENYLEPHRINE HYDROCHLORIDE 100 MCG: 10 INJECTION INTRAVENOUS at 07:52

## 2019-12-27 RX ADMIN — HYDROMORPHONE HYDROCHLORIDE 0.5 MG: 1 INJECTION, SOLUTION INTRAMUSCULAR; INTRAVENOUS; SUBCUTANEOUS at 09:55

## 2019-12-27 RX ADMIN — PROPOFOL 200 MCG/KG/MIN: 10 INJECTION, EMULSION INTRAVENOUS at 07:47

## 2019-12-27 RX ADMIN — FENTANYL CITRATE 50 MCG: 50 INJECTION, SOLUTION INTRAMUSCULAR; INTRAVENOUS at 07:45

## 2019-12-27 ASSESSMENT — COPD QUESTIONNAIRES: COPD: 0

## 2019-12-27 ASSESSMENT — LIFESTYLE VARIABLES: TOBACCO_USE: 0

## 2019-12-27 ASSESSMENT — MIFFLIN-ST. JEOR: SCORE: 1534.45

## 2019-12-27 NOTE — ANESTHESIA POSTPROCEDURE EVALUATION
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    Anesthesia Type:  General, LMA    Note:  Anesthesia Post Evaluation    Patient location during evaluation: PACU  Patient participation: Able to fully participate in evaluation  Level of consciousness: awake and alert  Pain management: adequate  Airway patency: patent  Cardiovascular status: acceptable  Respiratory status: acceptable and room air  Hydration status: acceptable  PONV: none     Anesthetic complications: None          Last vitals:  Vitals:    12/27/19 1000 12/27/19 1015 12/27/19 1030   BP: 118/75 113/67 117/65   Pulse: 80 78 74   Resp: 16 14 17   Temp: 36  C (96.8  F) 36.2  C (97.2  F) 36.4  C (97.5  F)   SpO2: 99% 100% 100%         Electronically Signed By: Dieudonne Frias MD  December 27, 2019  10:55 AM

## 2019-12-27 NOTE — DISCHARGE INSTRUCTIONS
Per Dr. Espino, Resume ASA tomorrow.  Information for Patients Discharging with a Transderm Scopolamine Patch     Dry mouth is a common side effect.    Drowsiness is another common side effect especially when combined with pain medication.  Please avoid activities that require mental alertness such as driving a car or making important legal decisions.    Since Scopolamine can cause temporary dilation of the pupils and blurred vision if it comes in contact with the eyes; be sure to wash your hands thoroughly with soap and water immediately after handling the patch.   When you remove your patch, please stick it to a tissue or paper towel for disposal.      Remove the patch immediately and contact a physician in the unlikely event that you experience symptoms of acute glaucoma (pain and reddening of the eyes, accompanied by dilated pupils).    Remove the patch if you develop any difficulties urinating.  If you cannot urinate after removing your patch, please notify your surgeon.    Remove the patch 24 hours after surgery.      Hip Arthroscopy Discharge Instructions  Dr. Sam Espino    Special Precautions 24 Hours After Surgery  1. You should rest and relax for the next 24 hours and you must make arrangements to have a responsible adult stay with you for at least 24 hours after your discharge.  2. Do not drive any motor vehicle or operate mechanical equipment for 24 hours following surgery or while taking narcotics.  3. Do not drink alcoholic beverages for 24 hours after surgery or while taking narcotics.  4. It is not unusual to feel lightheaded for up to 24 hours after surgery or while taking pain medication.  If you feel lightheaded, sit for a few minutes before standing and have someone assist you while walking.  5. If you experience nausea, drink clear liquids (7-Up, apple juice, broth, etc).  It is important to continue to drink fluids in order to avoid dehydration.  Progress to your regular diet as  "tolerated.  6. It is not unusual to run a low grade fever after surgery.  If your temperature is above 101.4 degrees, persists for longer than 24 hours, or is questionable in any way, do not hesitate to consult your doctor.  7. You may experience a dry mouth, sore throat, general muscle aches or sleep disturbances for the anesthesia and medications used during the surgery.  These symptoms usually resolve in 12 -24 hours.  8. Do not make any important or legal decisions for at least 24 hours after surgery.    Activities    Crutches:  TOE TOUCH WEIGHT BEARING WITH CRUTCHES ( 20 LBS OF PRESSURE FOOT FLAT MAX) for 3 WEEKS.    Coming off crutches too early could result in a stress fracture of your hip.   Avoid extreme external rotation x 3 weeks.    \"Well Leg\" exercise bike start on post-operative day #1     Apply ice to your hip for swelling and inflammation.    Elevate your lower leg above heart level.    Calf Pumps ( pump foot up/down) multiple times at least every hour.      Medications  Percocet (oxycodone narcotic plus Tylenol) - No alcohol, sedating medications or driving while on Percocet.  Close family member should monitor the patient closely while recovering on Narcotic Pain Medication.  No additional Tylenol (Acetaminophen) while on Percocet.     Celebrex ( NSAID anti-inflammatory).  Celebrex lets the bone heal properly (helps prevent abnormal bone re-growth called heterotopic ossification) and should be taken as directed.  No other NSAID ( like Ibuprofen, etc) while on Celebrex.  If you do not tolerate the Celebrex, stop the medication but call our office promptly.  Take Celebrex with food.       Restart your Aspirin as your primary doctor recommended for your PFO (patent foramen ovale)    You may use your anti-nausea medication     Use your stool softener/laxative medication as the Narcotic may cause constipation.     Special Post-Op Instructions  1.  May remove dressing approximately 24 hours after " surgery.  2.  You may shower and get the incisions wet approximately 48 hours after surgery.  Do not soak underwater for example in a bath until told otherwise at your post-op visit.  3. Re-dress incisions with band-aids.  4. Stitches will need to be removed at your first post-op visit.    NOTE:  If your post-op appointment has NOT been scheduled, please call [phone].  An appointment should be scheduled around 10 to 14 days after surgery.    Call your surgeon at [phone] if you experience signs or symptoms of an infection such as increased tenderness, a large amount of drainage or bleeding, severe pain, foul smelling drainage, marked redness, increased swelling, a temperature great than 101.4 degrees or you have questions or concerns that something is wrong.    If you develop any calf pain, calf swelling, calf redness/warmth or any chest pain, shortness of breath, pain with breathing or any urgent concern - go the Emergency Room right away.     PLEASE SEE YOUR ATTACHED PHYSICAL THERAPY PROTOCOL TO TAKE TO YOUR FIRST PHYSICAL THERAPY APPOINTMENT.      Same Day Surgery Discharge Instructions for  Sedation and General Anesthesia       It's not unusual to feel dizzy, light-headed or faint for up to 24 hours after surgery or while taking pain medication.  If you have these symptoms: sit for a few minutes before standing and have someone assist you when you get up to walk or use the bathroom.      You should rest and relax for the next 24 hours. We recommend you make arrangements to have an adult stay with you for at least 24 hours after your discharge.  Avoid hazardous and strenuous activity.      DO NOT DRIVE any vehicle or operate mechanical equipment for 24 hours following the end of your surgery.  Even though you may feel normal, your reactions may be affected by the medication you have received.      Do not drink alcoholic beverages for 24 hours following surgery.       Slowly progress to your regular diet  as you feel able. It's not unusual to feel nauseated and/or vomit after receiving anesthesia.  If you develop these symptoms, drink clear liquids (apple juice, ginger ale, broth, 7-up, etc. ) until you feel better.  If your nausea and vomiting persists for 24 hours, please notify your surgeon.        All narcotic pain medications, along with inactivity and anesthesia, can cause constipation. Drinking plenty of liquids and increasing fiber intake will help.      For any questions of a medical nature, call your surgeon.      Do not make important decisions for 24 hours.      If you had general anesthesia, you may have a sore throat for a couple of days related to the breathing tube used during surgery.  You may use Cepacol lozenges to help with this discomfort.  If it worsens or if you develop a fever, contact your surgeon.       If you feel your pain is not well managed with the pain medications prescribed by your surgeon, please contact your surgeon's office to let them know so they can address your concerns.

## 2019-12-27 NOTE — OP NOTE
Procedure Date: 12/27/2019      ATTENDING PHYSICIAN:  Sam Espino MD.      ASSISTANT:  Todd Penn PA-C.      PREOPERATIVE DIAGNOSES:   1.  Labral tear, left knee.   2.  Cam impingement, left hip.   3.  Mild anterior pincer impingement, left hip.   4.  Mixed impingement instability pattern, left hip.      POSTOPERATIVE DIAGNOSES:   1.  Labral tear, left knee.   2.  Cam impingement, left hip.   3.  Mild anterior pincer impingement, left hip.   4.  Mixed impingement instability pattern, left hip.      PROCEDURES:   1.  Labral repair with 2 PEEK suture anchors, left hip.   2.  Minimal AIIS spine decompression.   3.  Femoral resection osteoplasty, left hip.   4.  Acetabular chondroplasty, left hip.   5.  Significant capsular plication with 4 Ultrabraid sutures, left hip.      OPERATIVE INDICATIONS:  This is a female with a previous arthroscopy on her right side, did well, understood the risks, the benefits, expectations and wished to proceed.      OPERATIVE PROCEDURE:  The patient was identified in the holding area and taken to the operating room.  Her left lower extremity was sterilely prepped and widely draped in the usual fashion.  Examination under anesthesia revealed evidence for some cam type morphology, well maintained joint space.  Routine arthroscopy through an anterolateral portal and mid anterior portal revealed the above-named findings.  There was noted to be an anterior to superior labral chondral separation.  The acetabular chondroplasty was performed.  The rim was decorticated and the AIIS decompressed for about 1.5 cm proximal.  We then repaired the labrum with 2 PEEK suture anchors in the 9:30 and 10:30 positions for the left hip.  Ligamentum teres was intact.  Released traction and visualized both the junction with evidence for some cam type morphology and impingement that was recontoured from the medial/synovial folds taking care to identify the vessels and protect them throughout the  case.  There was improved range of motion and no evidence for impingement with dynamic assessment.  We then exsanguinated the hip of all bony debris and fluid and we plicated the capsule with four #2 Ultrabraid sutures and reapproximated the skin with some 3-0 nylon.  There were no complications.  No drains were placed and no specimens were sent.        Please note that a first assist was required for assistance placing suture anchors on the acetabular end as well as flexion, extension and rotation of the hip, dynamic assessment during resection osteoplasty and placement and release of traction throughout the case and note that CORTEZ corrective procedure arthroscopically requires a first assist and cannot be performed without one.         RADHA WORTHINGTON MD             D: 2019   T: 2019   MT: PANCHO      Name:     KIT SEVILLA   MRN:      [id number]        Account:        [id number]   :      1970           Procedure Date: 2019      Document: [id number]

## 2019-12-27 NOTE — ANESTHESIA CARE TRANSFER NOTE
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    Anesthesia Type:   General, LMA     Note:  Airway :Face Mask  Patient transferred to:PACU  Comments: At end of procedure, spontaneous respirations, LMA removed atraumatically, airway patent after LMA removal. Oxygen via facemask at 6 liters per minute to PACU. Oxygen tubing connected to wall O2 in PACU, SpO2, NiBP, and EKG monitors and alarms on and functioning, Gladys Hugger warmer connected to patient gown, report on patient's clinical status given to PACU RN, RN questions answered. Patient restless in PACU, two RNs with patient at handoff in recovery.Handoff Report: Identifed the Patient, Identified the Reponsible Provider, Reviewed the pertinent medical history, Discussed the surgical course, Reviewed Intra-OP anesthesia mangement and issues during anesthesia, Set expectations for post-procedure period and Allowed opportunity for questions and acknowledgement of understanding      Vitals: (Last set prior to Anesthesia Care Transfer)    CRNA VITALS  12/27/2019 0853 - 12/27/2019 0930      12/27/2019             Pulse:  92    SpO2:  98 %    Resp Rate (observed):  18                Electronically Signed By: CHIP Campbell CRNA  December 27, 2019  9:30 AM

## 2019-12-27 NOTE — OR NURSING
Patient up to recliner. Patient refused ain pill at this time. PNDS met, po per I&O sheet. Pt dressed, up in recliner and transported to Phase 2.

## 2019-12-27 NOTE — ANESTHESIA PREPROCEDURE EVALUATION
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]    Procedure(s):  LEFT HIP ARTHROSCOPY, FEMORAL RESECTION OSTEOPLASTY, MINIMALACEABULAR  RIM RESECTION, LABRAL REPAIR VERSUS LABRAL DEBRIDEMENT, CAPSULAR CLOSURE AND ANTERIOR INFERIOR ILIAC SPINE DECOMPRESSION (FRACTURE TABLE, LARGE C ARM)    Past Medical History:   Diagnosis Date     Abdominal pain     right sided, with nausea and weight loss     Gastritis      GERD (gastroesophageal reflux disease)      Hip pain     d/t labral tear     History of Helicobacter pylori infection      Hyperlipidaemia      Hypertension     2019: pt denies     Insomnia      Meniere's disease      Migraines      Motion sickness      Obese      PFO (patent foramen ovale)     SUKHWINDER 2010,echo 2008     PONV (postoperative nausea and vomiting)     takes a while for her to wake up & while waking up, gets belligerent     Past Surgical History:   Procedure Laterality Date     ANKLE SURGERY       ARTHROSCOPY ANKLE Right 12/10/2018    Procedure: RIGHT ANKLE SCOPE AND OS TRIGONUM EXCISION;  Surgeon: Mk Stephenson MD;  Location: [facility]     ARTHROSCOPY ANKLE, OPEN REPAIR LIGAMENT, COMBINED Right 10/3/2016    Procedure: COMBINED ARTHROSCOPY ANKLE, OPEN REPAIR LIGAMENT;  Surgeon: Mk Stephenson MD;  Location: [facility]     CHOLECYSTECTOMY       FINGER SURGERY      fx of right little finger     HIP SURGERY      labrel tear     LAPAROSCOPIC CHOLECYSTECTOMY       ROTATOR CUFF REPAIR RT/LT       ROTATOR CUFF REPAIR RT/LT       uterine ablation       varicose vein ablation         Anesthesia Evaluation     . Pt has had prior anesthetic. Type: General    No history of anesthetic complications          ROS/MED HX    ENT/Pulmonary:      (-) tobacco use, asthma and COPD   Neurologic:     (+)migraines,    (-) CVA, TIA and Neuropathy  "  Cardiovascular:     (+) Dyslipidemia, ----. : . . . :. .      (-) hypertension, CAD, irregular heartbeat/palpitations and stent   METS/Exercise Tolerance:     Hematologic:        (-) anemia   Musculoskeletal:   (+)  other musculoskeletal-       GI/Hepatic:        (-) GERD and liver disease   Renal/Genitourinary:      (-) renal disease   Endo:      (-) Type I DM, Type II DM and thyroid disease   Psychiatric:         Infectious Disease:  - neg infectious disease ROS       Malignancy:         Other:                          Physical Exam  Normal systems: cardiovascular, pulmonary and dental    Airway   Mallampati: I  TM distance: >3 FB  Neck ROM: full    Dental     Cardiovascular   Rhythm and rate: regular and normal      Pulmonary    breath sounds clear to auscultation            Lab Results   Component Value Date    HCG Negative 12/27/2019       Preop Vitals  BP Readings from Last 3 Encounters:   12/10/18 124/74   05/22/18 124/79   10/02/17 133/79    Pulse Readings from Last 3 Encounters:   12/10/18 54   05/22/18 70   12/06/17 74      Resp Readings from Last 3 Encounters:   12/10/18 16   12/06/17 18   11/01/17 18    SpO2 Readings from Last 3 Encounters:   12/10/18 98%   05/22/18 99%   12/06/17 99%      Temp Readings from Last 1 Encounters:   12/10/18 36.1  C (97  F)    Ht Readings from Last 1 Encounters:   12/10/18 1.651 m (5' 5\")      Wt Readings from Last 1 Encounters:   12/10/18 92.5 kg (204 lb)    Estimated body mass index is 33.95 kg/m  as calculated from the following:    Height as of 12/10/18: 1.651 m (5' 5\").    Weight as of 12/10/18: 92.5 kg (204 lb).       Anesthesia Plan      History & Physical Review  History and physical reviewed and following examination; no interval change.    ASA Status:  2 .    NPO Status:  > 6 hours    Plan for General and LMA with Intravenous and Propofol induction. Maintenance will be TIVA.    PONV prophylaxis:  Ondansetron (or other 5HT-3), Dexamethasone or Solumedrol, " Scopolamine patch and Other (See comment)    preop vistaril    Propofol infusion      Postoperative Care  Postoperative pain management:  Oral pain medications and Multi-modal analgesia.      Consents  Anesthetic plan, risks, benefits and alternatives discussed with:  Patient..                 Dieudonne Frias MD

## 2020-09-21 ENCOUNTER — RECORDS - HEALTHEAST (OUTPATIENT)
Dept: ADMINISTRATIVE | Facility: OTHER | Age: 50
End: 2020-09-21

## 2021-01-09 ENCOUNTER — HEALTH MAINTENANCE LETTER (OUTPATIENT)
Age: 51
End: 2021-01-09

## 2021-03-13 ENCOUNTER — HEALTH MAINTENANCE LETTER (OUTPATIENT)
Age: 51
End: 2021-03-13

## 2021-08-04 ENCOUNTER — THERAPY VISIT (OUTPATIENT)
Dept: PHYSICAL THERAPY | Facility: CLINIC | Age: 51
End: 2021-08-04
Payer: COMMERCIAL

## 2021-08-04 DIAGNOSIS — M54.2 CERVICALGIA: ICD-10-CM

## 2021-08-04 PROCEDURE — 97140 MANUAL THERAPY 1/> REGIONS: CPT | Mod: GP | Performed by: PHYSICAL THERAPIST

## 2021-08-04 PROCEDURE — 97161 PT EVAL LOW COMPLEX 20 MIN: CPT | Mod: GP | Performed by: PHYSICAL THERAPIST

## 2021-08-04 PROCEDURE — 97110 THERAPEUTIC EXERCISES: CPT | Mod: GP | Performed by: PHYSICAL THERAPIST

## 2021-08-04 NOTE — PROGRESS NOTES
Physical Therapy Initial Evaluation  Subjective:  The history is provided by the patient. No  was used.   Patient Health History  Liliana Poole being seen for Neck Pain.     Date of Onset: MD referral 7/21/21 and ongoing pain for 5-6 years.   Problem occurred: Unknown   Pain score: ranges 1-7/10.  General health as reported by patient is fair.  Pertinent medical history includes: migraines/headaches.   Red flags:  None as reported by patient.  Medical allergies: other. Other medical allergies details: statins, augmentin, IV contrast.   Surgeries include:  Orthopedic surgery. Other surgery history details: Labral Repairs B Hips, arthroscpic shoulders and  Right ankle repaired ligament.    Current medications:  Other. Other medications details: aspirin.    Current occupation is RN.   Primary job tasks include:  Lifting/carrying, prolonged standing, repetitive tasks and pushing/pulling.                  Therapist Generated HPI Evaluation  Problem details: [place] and completes heavy labor (hay edd lifting)..         Type of problem:  Cervical spine.    This is a chronic condition.  Condition occurred with:  Insidious onset.  Where condition occurred: for unknown reasons.  Patient reports pain:  Upper cervical spine and central cervical spine.  Pain is described as burning (tightness) and is constant.  Pain radiates to:  None. Pain is the same all the time.  Since onset symptoms are gradually worsening.  Associated symptoms:  Headache and loss of motion/stiffness (nausea). Exacerbated by: computer work.  and relieved by nothing.  Special tests included:  MRI (Neck 2008).    Restrictions due to condition include:  Working in normal job without restrictions.  Barriers include:  None as reported by patient.                        Objective:  Standing Alignment:    Cervical/Thoracic:  Forward head and cervical lordosis increased  Shoulder/UE:  Rounded shoulders, protracted scapula R and  "protracted scapula L                  Flexibility/Screens:   Positive screens:  Cervical  Upper Extremity:    Decreased left upper extremity flexibility at:  Pectoralis Major and Pectoralis Minor    Decreased right upper extremity flexibility present at:  Pectoralis Major and Pectoralis Minor                      Cervical/Thoracic Evaluation    AROM:  AROM Cervical:    Flexion:            75% with \"pulling\"  Extension:       WNL with limited C?T junction mobility  Rotation:         Left: 100%     Right: 75% tight  Side Bend:      Left: 50%     Right:  50%  AROM Thoracic:    Flexion:              Extension:           Rotation:            Left: 50%     Right: 50%    Strength: Fair Lower Trap & deep neck flex strength   Headaches: cervical (Every 10 days)  Cervical Myotomes:  normal                  DTR's:  not assessed          Cervical Dermatomes:  not assessed                    Cervical Palpation:  : TTP CT junction with limited mobility.  Tenderness present at Left:    Suboccipitals  Tenderness present at Right:    Suboccipitals  Functional Tests:  not assessed    Cervical Stability/Joint Clearing:      Left negative at: 1st Rib Expansion    Right negative at:  1st Rib Expansion    Cord Sign:  not assessed         Shoulder Evaluation:  ROM:  AROM:    Flexion:  Left:  155    Right:  135                                                                           General     ROS    Assessment/Plan:    Patient is a 51 year old female with cervical complaints.    Patient has the following significant findings with corresponding treatment plan.                Diagnosis 1:  Cervicalgia  Pain -  manual therapy, self management, education, directional preference exercise and home program  Decreased ROM/flexibility - manual therapy, therapeutic exercise and home program  Decreased joint mobility - manual therapy, therapeutic exercise and home program  Decreased strength - therapeutic exercise, therapeutic activities and home " program  Inflammation - self management/home program  Impaired muscle performance - neuro re-education and home program  Decreased function - therapeutic activities and home program  Impaired posture - neuro re-education and home program    Therapy Evaluation Codes:   1) History comprised of:   Personal factors that impact the plan of care:      Time since onset of symptoms.    Comorbidity factors that impact the plan of care are:      Migraines/headaches.     Medications impacting care: None.  2) Examination of Body Systems comprised of:   Body structures and functions that impact the plan of care:      Cervical spine.   Activity limitations that impact the plan of care are:      Lifting, Reading/Computer work and Sleeping.  3) Clinical presentation characteristics are:   Stable/Uncomplicated.  4) Decision-Making    Low complexity using standardized patient assessment instrument and/or measureable assessment of functional outcome.  Cumulative Therapy Evaluation is: Low complexity.    Previous and current functional limitations:  (See Goal Flow Sheet for this information)    Short term and Long term goals: (See Goal Flow Sheet for this information)     Communication ability:  Patient appears to be able to clearly communicate and understand verbal and written communication and follow directions correctly.  Treatment Explanation - The following has been discussed with the patient:   RX ordered/plan of care  Anticipated outcomes  Possible risks and side effects  This patient would benefit from PT intervention to resume normal activities.   Rehab potential is good.    Frequency:  1 X week, once daily  Duration:  for 6 weeks  Discharge Plan:  Achieve all LTG.  Independent in home treatment program.  Return to previous functional level by discharge.  Reach maximal therapeutic benefit.    Please refer to the daily flowsheet for treatment today, total treatment time and time spent performing 1:1 timed codes.

## 2021-08-11 ENCOUNTER — THERAPY VISIT (OUTPATIENT)
Dept: PHYSICAL THERAPY | Facility: CLINIC | Age: 51
End: 2021-08-11
Payer: COMMERCIAL

## 2021-08-11 DIAGNOSIS — M54.2 CERVICALGIA: ICD-10-CM

## 2021-08-11 PROCEDURE — 97110 THERAPEUTIC EXERCISES: CPT | Mod: GP | Performed by: PHYSICAL THERAPIST

## 2021-08-11 PROCEDURE — 97140 MANUAL THERAPY 1/> REGIONS: CPT | Mod: GP | Performed by: PHYSICAL THERAPIST

## 2021-08-18 ENCOUNTER — THERAPY VISIT (OUTPATIENT)
Dept: PHYSICAL THERAPY | Facility: CLINIC | Age: 51
End: 2021-08-18
Payer: COMMERCIAL

## 2021-08-18 DIAGNOSIS — M54.2 CERVICALGIA: ICD-10-CM

## 2021-08-18 PROCEDURE — 97110 THERAPEUTIC EXERCISES: CPT | Mod: GP | Performed by: PHYSICAL THERAPIST

## 2021-08-18 PROCEDURE — 97140 MANUAL THERAPY 1/> REGIONS: CPT | Mod: GP | Performed by: PHYSICAL THERAPIST

## 2021-08-25 ENCOUNTER — THERAPY VISIT (OUTPATIENT)
Dept: PHYSICAL THERAPY | Facility: CLINIC | Age: 51
End: 2021-08-25
Payer: COMMERCIAL

## 2021-08-25 DIAGNOSIS — M54.2 CERVICALGIA: ICD-10-CM

## 2021-08-25 PROCEDURE — 97140 MANUAL THERAPY 1/> REGIONS: CPT | Mod: GP | Performed by: PHYSICAL THERAPIST

## 2021-08-25 PROCEDURE — 97110 THERAPEUTIC EXERCISES: CPT | Mod: GP | Performed by: PHYSICAL THERAPIST

## 2021-08-30 ENCOUNTER — THERAPY VISIT (OUTPATIENT)
Dept: PHYSICAL THERAPY | Facility: CLINIC | Age: 51
End: 2021-08-30
Payer: COMMERCIAL

## 2021-08-30 DIAGNOSIS — M54.2 CERVICALGIA: ICD-10-CM

## 2021-08-30 PROCEDURE — 97140 MANUAL THERAPY 1/> REGIONS: CPT | Mod: GP | Performed by: PHYSICAL THERAPIST

## 2021-09-08 ENCOUNTER — THERAPY VISIT (OUTPATIENT)
Dept: PHYSICAL THERAPY | Facility: CLINIC | Age: 51
End: 2021-09-08
Payer: COMMERCIAL

## 2021-09-08 DIAGNOSIS — M54.2 CERVICALGIA: ICD-10-CM

## 2021-09-08 PROCEDURE — 97140 MANUAL THERAPY 1/> REGIONS: CPT | Mod: GP | Performed by: PHYSICAL THERAPIST

## 2021-09-16 ENCOUNTER — THERAPY VISIT (OUTPATIENT)
Dept: PHYSICAL THERAPY | Facility: CLINIC | Age: 51
End: 2021-09-16
Payer: COMMERCIAL

## 2021-09-16 DIAGNOSIS — M54.2 CERVICALGIA: ICD-10-CM

## 2021-09-16 PROCEDURE — 97140 MANUAL THERAPY 1/> REGIONS: CPT | Mod: GP | Performed by: PHYSICAL THERAPIST

## 2021-09-23 ENCOUNTER — THERAPY VISIT (OUTPATIENT)
Dept: PHYSICAL THERAPY | Facility: CLINIC | Age: 51
End: 2021-09-23
Payer: COMMERCIAL

## 2021-09-23 DIAGNOSIS — M54.2 CERVICALGIA: ICD-10-CM

## 2021-09-23 PROCEDURE — 97140 MANUAL THERAPY 1/> REGIONS: CPT | Mod: GP | Performed by: PHYSICAL THERAPIST

## 2021-10-07 ENCOUNTER — THERAPY VISIT (OUTPATIENT)
Dept: PHYSICAL THERAPY | Facility: CLINIC | Age: 51
End: 2021-10-07
Payer: COMMERCIAL

## 2021-10-07 DIAGNOSIS — M54.2 CERVICALGIA: ICD-10-CM

## 2021-10-07 PROCEDURE — 97140 MANUAL THERAPY 1/> REGIONS: CPT | Mod: GP | Performed by: PHYSICAL THERAPIST

## 2021-10-07 PROCEDURE — 97110 THERAPEUTIC EXERCISES: CPT | Mod: GP | Performed by: PHYSICAL THERAPIST

## 2021-10-07 NOTE — PROGRESS NOTES
Subjective:  HPI  Physical Exam                    Objective:  System    Physical Exam    General     ROS    Assessment/Plan:    DISCHARGE REPORT        Progress reporting period is from 8-4-21 to 10-7-21     NECK OSWESTRY SCORES:    8/4/21 = 16  9/8/21 = 6      SUBJECTIVE  Subjective changes noted by patient:  Liliana reports that she continues to maintain current status with mild infrequent HAs.     Current Pain level: 0/10.     Initial Pain level: 8/10.   Changes in function:  Yes (See Goal flowsheet attached for changes in current functional level)  Adverse reaction to treatment or activity: None    OBJECTIVE  Changes noted in objective findings:   B Upper Trap Muscle reduced hypertonicity. Reduced mobility C/T junction. CROM > 75% in all motions w/o pain.  Improved postural awareness and scapular strength.      ASSESSMENT/PLAN  Updated problem list and treatment plan: Diagnosis 1:  Cervicalgia and HAs  Pain -  manual therapy, splint/taping/bracing/orthotics, self management, education, directional preference exercise and home program  Decreased ROM/flexibility - manual therapy, therapeutic exercise, therapeutic activity and home program  Decreased joint mobility - manual therapy, therapeutic exercise, therapeutic activity and home program  Decreased strength - therapeutic exercise, therapeutic activities and home program  Inflammation - self management/home program  Impaired muscle performance - neuro re-education and home program  Decreased function - therapeutic activities and home program  Impaired posture - neuro re-education and home program  STG/LTGs have been met or progress has been made towards goals:  Yes (See Goal flow sheet completed today.)  Assessment of Progress: The patient's condition is improving.  The patient has met all of their long term goals.  Self Management Plans:  Patient is independent in a home treatment program.  Patient is independent in self management of symptoms.  I have  re-evaluated this patient and find that the nature, scope, duration and intensity of the therapy is appropriate for the medical condition of the patient.  Liliana continues to require the following intervention to meet STG and LTG's:  PT intervention is no longer required to meet STG/LTG.    Recommendations:  This patient is ready to be discharged from therapy and continue their home treatment program.    Please refer to the daily flowsheet for treatment today, total treatment time and time spent performing 1:1 timed codes.

## 2021-10-07 NOTE — LETTER
[facility]  [address]  [place] [facility] [place] [id number] [phone]    2021  Re: Liliana Poole   :   1970  MRN:  [id number]   REFERRING PHYSICIAN:   MD [facility]    Date of Initial Evaluation:  2021  Visits:  Rxs Used: 9  Reason for Referral:  Cervicalgia    DISCHARGE REPORT  Progress reporting period is from 21 to 10-7-21  NECK OSWESTRY SCORES:  21 = 16  21 = 6    SUBJECTIVE  Subjective changes noted by patient:  Liliana reports that she continues to maintain current status with mild infrequent HAs.     Current Pain level: 0/10.     Initial Pain level: 8/10.   Changes in function:  Yes (See Goal flowsheet attached for changes in current functional level)  Adverse reaction to treatment or activity: None  OBJECTIVE  Changes noted in objective findings:   B Upper Trap Muscle reduced hypertonicity. Reduced mobility C/T junction. CROM > 75% in all motions w/o pain.  Improved postural awareness and scapular strength.    ASSESSMENT/PLAN  Updated problem list and treatment plan: Diagnosis 1:  Cervicalgia and HAs.  Pain -  manual therapy, splint/taping/bracing/orthotics, self management, education, directional preference exercise and home program  Decreased ROM/flexibility - manual therapy, therapeutic exercise, therapeutic activity and home program  Decreased joint mobility - manual therapy, therapeutic exercise, therapeutic activity and home program  Decreased strength - therapeutic exercise, therapeutic activities and home program  Inflammation - self management/home program  Impaired muscle performance - neuro re-education and home program  Decreased function - therapeutic activities and home program  Impaired posture - neuro re-education and home program  STG/LTGs have been met or progress has been made towards goals:  Yes (See Goal flow sheet completed  today.)  Assessment of Progress: The patient's condition is improving.  The patient has met all of their long term goals.  Self Management Plans:  Patient is independent in a home treatment program.  Patient is independent in self management of symptoms.  I have re-evaluated this patient and find that the nature, scope, duration and intensity of  Re: Liliana Poole   :   1970, page 2      the therapy is appropriate for the medical condition of the patient.  Liliana continues to require the following intervention to meet STG and LTG's:  PT intervention is no longer required to meet STG/LTG.    Recommendations:  This patient is ready to be discharged from therapy and continue their home treatment program.      Thank you for your referral.    INQUIRIES  Therapist: Estrella Castro [facility] ([facility])   [address]  [facility] [address]  Phone: [phone]  Fax: [phone]

## 2021-12-23 ENCOUNTER — THERAPY VISIT (OUTPATIENT)
Dept: PHYSICAL THERAPY | Facility: CLINIC | Age: 51
End: 2021-12-23
Payer: COMMERCIAL

## 2021-12-23 DIAGNOSIS — M25.511 ACUTE PAIN OF RIGHT SHOULDER: ICD-10-CM

## 2021-12-23 DIAGNOSIS — M67.921 TENDINOPATHY OF RIGHT BICEPS TENDON: ICD-10-CM

## 2021-12-23 PROCEDURE — 97161 PT EVAL LOW COMPLEX 20 MIN: CPT | Mod: GP | Performed by: PHYSICAL THERAPIST

## 2021-12-23 PROCEDURE — 97035 APP MDLTY 1+ULTRASOUND EA 15: CPT | Mod: GP | Performed by: PHYSICAL THERAPIST

## 2021-12-23 PROCEDURE — 97140 MANUAL THERAPY 1/> REGIONS: CPT | Mod: GP | Performed by: PHYSICAL THERAPIST

## 2021-12-23 NOTE — PROGRESS NOTES
Physical Therapy Initial Evaluation  Subjective:  The history is provided by the patient. No  was used.   Patient Health History  Liliana Poole being seen for  Acute R distal biceps strain.     Date of Onset: Nov 2021.   Problem occurred: Possibly from lifting 60 bails of hay x 3    Pain is reported as 4/10 on pain scale.  General health as reported by patient is good.  Pertinent medical history includes: migraines/headaches.   Red flags:  None as reported by patient.  Medical allergies: none.   Surgeries include:  Orthopedic surgery.    Current medications:  Other. Other medications details: Medrose dose pack.    Current occupation is RN.   Primary job tasks include:  Lifting/carrying, pushing/pulling and repetitive tasks.                  Therapist Generated HPI Evaluation  Problem details: Left side arm dominant.         Affected Side: right elbow.    This is a new condition.  Condition occurred with:  Lifting.  Where condition occurred: at home.  Patient reports pain:  Anterior.  Pain is described as aching and is constant.  Pain is the same all the time.  Since onset symptoms are unchanged.  Symptoms are exacerbated by lifting and carrying (flexed prolonged position or using pitchfork)  Relieved by: steroid   Special tests included:  MRI.  Previous treatment includes physical therapy. There was moderate improvement following previous treatment.  Restrictions due to condition include:  Working in normal job without restrictions.  Barriers include:  None as reported by patient.                        Objective:  System                        ROM:  AROM:  normal                    PROM:  normal                        Strength:  Strength wnl elbow/wrist: Normal strength with pain on resisted right elbow flexion and shoulder flexion.                    Ligament Testing:not assessed        Special Testing:      Right wrist/elbow negative for the following special tests: Lateral  Epicondylitis; Medial Epicondylitis or Pronator Teres  Palpation:      Right wrist/elbow tenderness present at:Biceps  Mobility:  not assessed                                          General     ROS    Assessment/Plan:    Patient is a 51 year old female with right side elbow complaints.    Patient has the following significant findings with corresponding treatment plan.                Diagnosis 1:  R Distal Biceps Tendonopathy  Pain -  hot/cold therapy, US, manual therapy, splint/taping/bracing/orthotics, self management, education and home program  Decreased strength - therapeutic exercise, therapeutic activities and home program  Inflammation - cold therapy, US and self management/home program  Impaired muscle performance - neuro re-education and home program  Decreased function - therapeutic activities and home program    Therapy Evaluation Codes:   1) History comprised of:   Personal factors that impact the plan of care:      None.    Comorbidity factors that impact the plan of care are:      Migraines/headaches.     Medications impacting care: Steroids.  2) Examination of Body Systems comprised of:   Body structures and functions that impact the plan of care:      Elbow.   Activity limitations that impact the plan of care are:      Lifting.  3) Clinical presentation characteristics are:   Stable/Uncomplicated.  4) Decision-Making    Low complexity using standardized patient assessment instrument and/or measureable assessment of functional outcome.  Cumulative Therapy Evaluation is: Low complexity.    Previous and current functional limitations:  (See Goal Flow Sheet for this information)    Short term and Long term goals: (See Goal Flow Sheet for this information)     Communication ability:  Patient appears to be able to clearly communicate and understand verbal and written communication and follow directions correctly.  Treatment Explanation - The following has been discussed with the patient:   RX  ordered/plan of care  Anticipated outcomes  Possible risks and side effects  This patient would benefit from PT intervention to resume normal activities.   Rehab potential is good.    Frequency:  2 X week, once daily  Duration:  for 5 weeks  Discharge Plan:  Achieve all LTG.  Independent in home treatment program.  Return to previous functional level by discharge.  Reach maximal therapeutic benefit.    Please refer to the daily flowsheet for treatment today, total treatment time and time spent performing 1:1 timed codes.

## 2021-12-23 NOTE — LETTER
[facility]  [address]  [facility] [address]  [phone]    2021    Re: Liliana Poole   :   1970  MRN:  [id number]   REFERRING PHYSICIAN:   Vern HENDERSON [facility]    Date of Initial Evaluation:  2021  Visits:  Rxs Used: 1  Reason for Referral:     Acute pain of right shoulder  Tendinopathy of right biceps tendon    EVALUATION SUMMARY    Physical Therapy Initial Evaluation  Subjective:  The history is provided by the patient. No  was used.   Patient Health History  Liliana Poole being seen for  Acute R distal biceps strain.     Date of Onset: 2021.   Problem occurred: Possibly from lifting 60 bails of hay x 3    Pain is reported as 4/10 on pain scale.  General health as reported by patient is good.  Pertinent medical history includes: migraines/headaches.   Red flags:  None as reported by patient.  Medical allergies: none.   Surgeries include:  Orthopedic surgery.    Current medications:  Other. Other medications details: Medrose dose pack.    Current occupation is RN.   Primary job tasks include:  Lifting/carrying, pushing/pulling and repetitive tasks.                  Therapist Generated HPI Evaluation  Problem details: Left side arm dominant.         Affected Side: right elbow.    This is a new condition.  Condition occurred with:  Lifting.  Where condition occurred: at home.  Patient reports pain:  Anterior.  Pain is described as aching and is constant.  Pain is the same all the time.  Since onset symptoms are unchanged.  Symptoms are exacerbated by lifting and carrying (flexed prolonged position or using pitchfork)  Relieved by: steroid   Special tests included:  MRI.  Re: Liliana Poole   :   1970  Previous treatment includes physical therapy. There was moderate improvement following previous treatment.  Restrictions  due to condition include:  Working in normal job without restrictions.  Barriers include:  None as reported by patient.               Objective:  System     ROM:  AROM:  normal  PROM:  normal  Strength:  Strength wnl elbow/wrist: Normal strength with pain on resisted right elbow flexion and shoulder flexion.  Ligament Testing:not assessed  Special Testing:      Right wrist/elbow negative for the following special tests: Lateral Epicondylitis; Medial Epicondylitis or Pronator Teres  Palpation:      Right wrist/elbow tenderness present at:Biceps  Mobility:  not assessed      General     ROS    Assessment/Plan:    Patient is a 51 year old female with right side elbow complaints.    Patient has the following significant findings with corresponding treatment plan.                Diagnosis 1:  R Distal Biceps Tendonopathy  Pain -  hot/cold therapy, US, manual therapy, splint/taping/bracing/orthotics, self management, education and home program  Decreased strength - therapeutic exercise, therapeutic activities and home program  Inflammation - cold therapy, US and self management/home program  Impaired muscle performance - neuro re-education and home program  Decreased function - therapeutic activities and home program    Therapy Evaluation Codes:   1) History comprised of:   Personal factors that impact the plan of care:     None.    Comorbidity factors that impact the plan of care are:     Migraines/headaches.     Medications impacting care: Steroids.  2) Examination of Body Systems comprised of:   Body structures and functions that impact the plan of care:     Elbow.   Activity limitations that impact the plan of care are:     Lifting.  3) Clinical presentation characteristics are:  Stable/Uncomplicated.  4) Decision-Making  Re: Liliana Poole   :   1970     Low complexity using standardized patient assessment instrument and/or  measureable assessment of functional outcome.  Cumulative Therapy Evaluation  "is: Low complexity.  Previous and current functional limitations:  (See Goal Flow Sheet for this information)    Short term and Long term goals: (See Goal Flow Sheet for this information)   Communication ability:  Patient appears to be able to clearly communicate and understand verbal and written communication and follow directions correctly.  Treatment Explanation - The following has been discussed with the patient:   RX ordered/plan of care  Anticipated outcomes  Possible risks and side effects  This patient would benefit from PT intervention to resume normal activities.   Rehab potential is good.    Frequency:  2 X week, once daily  Duration:  for 5 weeks  Discharge Plan:  Achieve all LTG.  Independent in home treatment program.  Return to previous functional level by discharge.  Reach maximal therapeutic benefit.    Thank you for your referral.    INQUIRIES  Therapist: Estrella \"Celestina\" CINTHIA Castro  [address]  [facility] [address]  Phone: [phone]  Fax: [phone]     "

## 2021-12-27 ENCOUNTER — THERAPY VISIT (OUTPATIENT)
Dept: PHYSICAL THERAPY | Facility: CLINIC | Age: 51
End: 2021-12-27
Payer: COMMERCIAL

## 2021-12-27 DIAGNOSIS — M25.511 ACUTE PAIN OF RIGHT SHOULDER: ICD-10-CM

## 2021-12-27 DIAGNOSIS — M67.921 TENDINOPATHY OF RIGHT BICEPS TENDON: ICD-10-CM

## 2021-12-27 PROCEDURE — 97140 MANUAL THERAPY 1/> REGIONS: CPT | Mod: GP | Performed by: PHYSICAL THERAPIST

## 2021-12-27 PROCEDURE — 97035 APP MDLTY 1+ULTRASOUND EA 15: CPT | Mod: GP | Performed by: PHYSICAL THERAPIST

## 2021-12-30 ENCOUNTER — THERAPY VISIT (OUTPATIENT)
Dept: PHYSICAL THERAPY | Facility: CLINIC | Age: 51
End: 2021-12-30
Payer: COMMERCIAL

## 2021-12-30 DIAGNOSIS — M25.511 ACUTE PAIN OF RIGHT SHOULDER: ICD-10-CM

## 2021-12-30 DIAGNOSIS — M67.921 TENDINOPATHY OF RIGHT BICEPS TENDON: ICD-10-CM

## 2021-12-30 PROCEDURE — 97035 APP MDLTY 1+ULTRASOUND EA 15: CPT | Mod: GP | Performed by: PHYSICAL THERAPIST

## 2021-12-30 PROCEDURE — 97140 MANUAL THERAPY 1/> REGIONS: CPT | Mod: GP | Performed by: PHYSICAL THERAPIST

## 2022-01-05 ENCOUNTER — THERAPY VISIT (OUTPATIENT)
Dept: PHYSICAL THERAPY | Facility: CLINIC | Age: 52
End: 2022-01-05
Payer: COMMERCIAL

## 2022-01-05 DIAGNOSIS — M25.511 ACUTE PAIN OF RIGHT SHOULDER: Primary | ICD-10-CM

## 2022-01-05 PROCEDURE — 97161 PT EVAL LOW COMPLEX 20 MIN: CPT | Mod: GP | Performed by: PHYSICAL THERAPIST

## 2022-01-05 PROCEDURE — 97110 THERAPEUTIC EXERCISES: CPT | Mod: GP | Performed by: PHYSICAL THERAPIST

## 2022-01-05 NOTE — PROGRESS NOTES
Physical Therapy Initial Evaluation  Subjective:  The history is provided by the patient. No  was used.   Patient Health History  Liliana Poole being seen for R Shoulder Pain.     Date of Onset: decompression surgery R shoulder 2012.   Problem occurred: Worsening   Pain score: Ranges 4-7/10.  General health as reported by patient is good.  Health conditions: see elbow health history.   Red flags:  None as reported by patient.      Other surgery history details: see elbow health history.     Other medications details: see elbow health history.    Current occupation is RN.   Primary job tasks include:  Repetitive tasks, pushing/pulling, lifting/carrying, prolonged standing and prolonged sitting.                  Therapist Generated HPI Evaluation  Problem details: Left Arm Dominant    FINDINGS:     Rotator Cuff: The supraspinatus, infraspinatus, teres minor and subscapularis tendons are intact.. The muscles of the rotator cuff are symmetric without fatty infiltration nor atrophy.     Biceps/Labral Complex: There is globular T2 contrast signal at the base of the superior aspect of the posterior labrum (image 9 and 10 on series 601) consistent with a labral tear. This tear is posterior to the biceps anchor.     The biceps tendon is normal in signal intensity, contour and course. The biceps anchor is intact.       Acromion/Subacromial Space: The acromioclavicular joint is normal. No subacromial fluid. No subacromial narrowing. No abnormal marrow signal.     Glenohumeral Joint: Intra-articular contrast is noted.     No focal cartilaginous defect. No subchondral reactive marrow edema.   There is overdistention of the glenohumeral capsule with leakage of contrast from the axillary pouch.    Procedure Note    Ave Rivera MD - 12/07/2021   Formatting of this note might be different from the original.   EXAM : MRI ARTHROGRAM SHOULDER RT     DATE: 12/6/2021 4:19 PM   .         Type of  problem:  Right shoulder.    This is a chronic condition.  Condition occurred with:  Unknown cause.  Where condition occurred: for unknown reasons.  Patient reports pain:  Anterior (superior).  Pain is described as aching (burning and pinching) and is constant.  Radiates to: scapular pain right. Pain is worse in the A.M..  Since onset symptoms are gradually worsening.  Associated symptoms:  Loss of motion/stiffness. Symptoms are exacerbated by lying on extremity, lifting, carrying and using arm overhead (repetitive motions)  and relieved by rest.  Special tests included:  MRI (See above results).  Previous treatment includes physical therapy. There was moderate improvement following previous treatment.  Restrictions due to condition include:  Working in normal job without restrictions.  Barriers include:  None as reported by patient.                        Objective:  Standing Alignment:    Cervical/Thoracic:  Thoracic kyphosis increased  Shoulder/UE:  Rounded shoulders, protracted scapula R, protracted scapula L and humeral head anterior R                  Flexibility/Screens:   Positive screens:  Shoulder  Upper Extremity:    Decreased left upper extremity flexibility at:  Pectoralis Major and Pectoralis Minor    Decreased right upper extremity flexibility present at:  Pectoralis Major and Pectoralis Minor                           Shoulder Evaluation:  ROM:  AROM:    Flexion:  Left:  155    Right:  135    Abduction:  Left: 160   Right:  135                Flexion/External Rotation:  Left:  T2    Right:  C5  Extension/Internal Rotation:  Left:  T9    Right:  L5+    PROM:    Flexion:  Right: 145      Abduction:  Right:  165+    Internal Rotation:  Right:  75  External Rotation:  Right:  90                    Strength:  : Strong and painfree MMT. Weak scapular retract/depression.                      Stability Testing:  not assessed      Special Tests:      Right shoulder positive for the following special  tests:Impingement  Right shoulder negative for the following special tests:Rotator cuff tear  Palpation:      Right shoulder tenderness present at: Infraspinatus; Teres Minor and Bicipital Groove  Mobility Tests:      Glenohumeral posterior right:  Hypomobile  Glenohumeral inferior right:  Hypomobile                                             General     ROS    Assessment/Plan:    Patient is a 51 year old female with right side shoulder complaints.    Patient has the following significant findings with corresponding treatment plan.                Diagnosis 1:  Suspect Anterior Impingement (non-capsular pattern of GH restriction)  Pain -  hot/cold therapy, US, manual therapy, self management, education and home program  Decreased ROM/flexibility - manual therapy, therapeutic exercise, therapeutic activity and home program  Decreased joint mobility - manual therapy, therapeutic exercise, therapeutic activity and home program  Decreased strength - therapeutic exercise, therapeutic activities and home program  Inflammation - cold therapy and self management/home program  Impaired muscle performance - neuro re-education and home program  Decreased function - therapeutic activities and home program  Impaired posture - neuro re-education and home program    Therapy Evaluation Codes:   1) History comprised of:   Personal factors that impact the plan of care:      Time since onset of symptoms.    Comorbidity factors that impact the plan of care are:      See Elbow Eval.     Medications impacting care: See Elbow Eval.  2) Examination of Body Systems comprised of:   Body structures and functions that impact the plan of care:      Shoulder.   Activity limitations that impact the plan of care are:      Dressing, Lifting, Working, Sleeping and Reaching overhead.  3) Clinical presentation characteristics are:   Stable/Uncomplicated.  4) Decision-Making    Low complexity using standardized patient assessment instrument and/or  measureable assessment of functional outcome.  Cumulative Therapy Evaluation is: Low complexity.    Previous and current functional limitations:  (See Goal Flow Sheet for this information)    Short term and Long term goals: (See Goal Flow Sheet for this information)     Communication ability:  Patient appears to be able to clearly communicate and understand verbal and written communication and follow directions correctly.  Treatment Explanation - The following has been discussed with the patient:   RX ordered/plan of care  Anticipated outcomes  Possible risks and side effects  This patient would benefit from PT intervention to resume normal activities.   Rehab potential is good.    Frequency:  1 X week, once daily  Duration:  for 6 weeks  Discharge Plan:  Achieve all LTG.  Independent in home treatment program.  Return to previous functional level by discharge.  Reach maximal therapeutic benefit.    Please refer to the daily flowsheet for treatment today, total treatment time and time spent performing 1:1 timed codes.

## 2022-01-05 NOTE — LETTER
[facility]  [address]  [facility] [address]  [phone]    2022  Re: Liliana Poole   :   1970  MRN:  [id number]   REFERRING PHYSICIAN:   MD BEATRIZ [facility]  Date of Initial Evaluation:  2022  Visits: 1   Reason for Referral:  Acute pain of right shoulder    EVALUATION SUMMARY    Physical Therapy Initial Evaluation  Subjective:  The history is provided by the patient. No  was used.   Patient Health History  Liliana Poole being seen for R Shoulder Pain.   Date of Onset: decompression surgery R shoulder .   Problem occurred: Worsening   Pain score: Ranges 4-7/10.  General health as reported by patient is good.  Health conditions: see elbow health history.   Red flags:  None as reported by patient.  Other surgery history details: see elbow health history.    Other medications details: see elbow health history.    Current occupation is RN.   Primary job tasks include:  Repetitive tasks, pushing/pulling, lifting/carrying, prolonged standing and prolonged sitting.               Therapist Generated HPI Evaluation  Problem details: Left Arm Dominant  FINDINGS:   Rotator Cuff: The supraspinatus, infraspinatus, teres minor and subscapularis tendons are intact.. The muscles of the rotator cuff are symmetric without fatty infiltration nor atrophy.   Biceps/Labral Complex: There is globular T2 contrast signal at the base of the superior aspect of the posterior labrum (image 9 and 10 on series 601) consistent with a labral tear. This tear is posterior to the biceps anchor.   The biceps tendon is normal in signal intensity, contour and course. The biceps anchor is intact.   Acromion/Subacromial Space: The acromioclavicular joint is normal. No subacromial fluid. No subacromial narrowing. No abnormal marrow signal.   Glenohumeral Joint:  Intra-articular contrast is noted.   No focal cartilaginous defect. No subchondral reactive marrow edema.   There is overdistention of the glenohumeral capsule with leakage of contrast from the axillary pouch.      Re: Liliana Poole   :   1970, page 2    Procedure Note    Ave Rivera MD - 2021   Formatting of this note might be different from the original.   EXAM : MRI ARTHROGRAM SHOULDER RT   DATE: 2021 4:19 PM      Type of problem:  Right shoulder.  This is a chronic condition.  Condition occurred with:  Unknown cause.  Where condition occurred: for unknown reasons.  Patient reports pain:  Anterior (superior).  Pain is described as aching (burning and pinching) and is constant.  Radiates to: scapular pain right. Pain is worse in the A.M..  Since onset symptoms are gradually worsening.  Associated symptoms:  Loss of motion/stiffness. Symptoms are exacerbated by lying on extremity, lifting, carrying and using arm overhead (repetitive motions), relieved by rest.  Special tests included:  MRI (See above results).  Previous treatment includes physical therapy. There was moderate improvement following previous treatment.  Restrictions due to condition include:  Working in normal job without restrictions.  Barriers include:  None as reported by patient.              Objective:  Standing Alignment:    Cervical/Thoracic:  Thoracic kyphosis increased  Shoulder/UE:  Rounded shoulders, protracted scapula R, protracted scapula L and humeral head anterior R  Flexibility/Screens:   Positive screens:  Shoulder  Upper Extremity:    Decreased left upper extremity flexibility at:  Pectoralis Major and Pectoralis Minor  Decreased right upper extremity flexibility at:  Pectoralis Major and Pectoralis Minor        Shoulder Evaluation:  ROM:  AROM:    Flexion:  Left:  155    Right:  135  Abduction:  Left: 160   Right:  135  Flexion/External Rotation:  Left:  T2    Right:  C5  Extension/Internal  Rotation:  Left:  T9    Right:  L5+    PROM:    Flexion:  Right: 145    Abduction:  Right:  165+  Internal Rotation:  Right:  75  External Rotation:  Right:  90  Strength:  : Strong and painfree MMT. Weak scapular retract/depression.  Stability Testing:  not assessed  Special Tests:    Right shoulder positive for the following special tests:Impingement  Right shoulder negative for the following special tests:Rotator cuff tear  Palpation:      Re: Liliana Poole   :   1970, page 3    Right shoulder tenderness present at: Infraspinatus; Teres Minor and Bicipital Groove  Mobility Tests:    Glenohumeral posterior right:  Hypomobile  Glenohumeral inferior right:  Hypomobile      Assessment/Plan:    Patient is a 51 year old female with right side shoulder complaints.    Patient has the following significant findings with corresponding treatment plan.                Diagnosis 1:  Suspect Anterior Impingement (non-capsular pattern of GH restriction)  Pain -  hot/cold therapy, US, manual therapy, self manage, education. home program  Decreased ROM/flexibility - manual therapy, therapeutic exercise, therapeutic activity and home program  Decreased joint mobility - manual therapy, therapeutic exercise, therapeutic activity and home program  Decreased strength - therapeutic exercise, therapeutic activities and home program  Inflammation - cold therapy and self management/home program  Impaired muscle performance - neuro re-education and home program  Decreased function - therapeutic activities and home program  Impaired posture - neuro re-education and home program    Therapy Evaluation Codes:   1) History comprised of:  Personal factors that impact the plan of care:      Time since onset of symptoms.    Comorbidity factors that impact the plan of care are: see Elbow Eval.     Medications impacting care: See Elbow Eval.  2) Examination of Body Systems comprised of: Body structures and functions that impact the  plan of care:  shoulder.   Activity limitations that impact the plan of care are: Dressing, Lifting, Working, Sleeping and Reaching overhead.  3) Clinical presentation characteristics are: Stable/Uncomplicated.  4) Decision-Making: Low complexity using standardized patient assessment instrument and/or measureable assessment of functional outcome.  Cumulative Therapy Evaluation is: Low complexity.  Previous and current functional limitations:  (See Goal Flow Sheet for this information)    Short term and Long term goals: (See Goal Flow Sheet for this information)   Communication ability:  Patient appears to be able to clearly communicate and understand verbal and written communication and follow directions correctly.  Treatment Explanation - The following has been discussed with the patient:   RX ordered/plan of care  Anticipated outcomes  Possible risks and side effects  This patient would benefit from PT intervention to resume normal activities.   Rehab potential is good.    Frequency:  1 X week, once daily  Duration:  for 6 weeks  Discharge Plan:  Achieve all LTG.  Independent in home treatment program.  Return to previous functional level by discharge.  Reach maximal therapeutic benefit.  Re: Liliana Poole   :   1970, page 4      Thank you for your referral.    INQUIRIES  Therapist: Estrella Castro PT (Peg)   [address]  [facility] [address]  Phone: [phone]  Fax: [phone]

## 2022-01-12 ENCOUNTER — THERAPY VISIT (OUTPATIENT)
Dept: PHYSICAL THERAPY | Facility: CLINIC | Age: 52
End: 2022-01-12
Payer: COMMERCIAL

## 2022-01-12 DIAGNOSIS — M25.511 ACUTE PAIN OF RIGHT SHOULDER: ICD-10-CM

## 2022-01-12 DIAGNOSIS — M67.921 TENDINOPATHY OF RIGHT BICEPS TENDON: ICD-10-CM

## 2022-01-12 PROCEDURE — 97035 APP MDLTY 1+ULTRASOUND EA 15: CPT | Mod: GP | Performed by: PHYSICAL THERAPIST

## 2022-01-12 PROCEDURE — 97110 THERAPEUTIC EXERCISES: CPT | Mod: GP | Performed by: PHYSICAL THERAPIST

## 2022-01-12 PROCEDURE — 97140 MANUAL THERAPY 1/> REGIONS: CPT | Mod: GP | Performed by: PHYSICAL THERAPIST

## 2022-01-18 ENCOUNTER — THERAPY VISIT (OUTPATIENT)
Dept: PHYSICAL THERAPY | Facility: CLINIC | Age: 52
End: 2022-01-18
Payer: COMMERCIAL

## 2022-01-18 DIAGNOSIS — M25.511 ACUTE PAIN OF RIGHT SHOULDER: ICD-10-CM

## 2022-01-18 DIAGNOSIS — M67.921 TENDINOPATHY OF RIGHT BICEPS TENDON: ICD-10-CM

## 2022-01-18 PROCEDURE — 97035 APP MDLTY 1+ULTRASOUND EA 15: CPT | Mod: GP | Performed by: PHYSICAL THERAPIST

## 2022-01-18 PROCEDURE — 97110 THERAPEUTIC EXERCISES: CPT | Mod: GP | Performed by: PHYSICAL THERAPIST

## 2022-01-18 PROCEDURE — 97140 MANUAL THERAPY 1/> REGIONS: CPT | Mod: GP | Performed by: PHYSICAL THERAPIST

## 2022-01-26 ENCOUNTER — THERAPY VISIT (OUTPATIENT)
Dept: PHYSICAL THERAPY | Facility: CLINIC | Age: 52
End: 2022-01-26
Payer: COMMERCIAL

## 2022-01-26 DIAGNOSIS — M25.511 ACUTE PAIN OF RIGHT SHOULDER: Primary | ICD-10-CM

## 2022-01-26 PROCEDURE — 97112 NEUROMUSCULAR REEDUCATION: CPT | Mod: GP | Performed by: PHYSICAL THERAPIST

## 2022-01-26 PROCEDURE — 97110 THERAPEUTIC EXERCISES: CPT | Mod: GP | Performed by: PHYSICAL THERAPIST

## 2022-01-26 PROCEDURE — 97140 MANUAL THERAPY 1/> REGIONS: CPT | Mod: GP | Performed by: PHYSICAL THERAPIST

## 2022-02-09 ENCOUNTER — THERAPY VISIT (OUTPATIENT)
Dept: PHYSICAL THERAPY | Facility: CLINIC | Age: 52
End: 2022-02-09
Payer: COMMERCIAL

## 2022-02-09 DIAGNOSIS — M25.511 ACUTE PAIN OF RIGHT SHOULDER: Primary | ICD-10-CM

## 2022-02-09 PROBLEM — M67.921 TENDINOPATHY OF RIGHT BICEPS TENDON: Status: RESOLVED | Noted: 2021-12-23 | Resolved: 2022-02-09

## 2022-02-09 PROCEDURE — 97110 THERAPEUTIC EXERCISES: CPT | Mod: GP | Performed by: PHYSICAL THERAPIST

## 2022-02-09 PROCEDURE — 97140 MANUAL THERAPY 1/> REGIONS: CPT | Mod: GP | Performed by: PHYSICAL THERAPIST

## 2022-02-09 NOTE — LETTER
[facility]  [address]  [facility] [address] [phone]    2022  Re: Liliana Poole   :   1970  MRN:  [id number]   REFERRING PHYSICIANS: Dr Vern Baig.   Also, Dr. Vern HENDERSON [facility]  Date of Initial Evaluation:  2022  Visits:     Reason for Referral:  Acute pain of right shoulder    DISCHARGE REPORT  Progress reporting period is from 22 to 22.     SUBJECTIVE  Subjective changes noted by patient: Liliana reports that she is able to sleep on right side with less interruptions. ROM of right shoulder improving. Cheif c/o mild intermittent joint pain with sleep.  She is undergoing hand therapy for her right elbow injury.   Current Pain level: 0/10 (Night pain = 0-4/10).     Initial Pain level: 7/10.   Changes in function:  Yes (See Goal flowsheet attached for changes in current functional level)  Adverse reaction to treatment or activity: None  OBJECTIVE  Changes noted in objective findings:   Active R Shoulder Flex = 160 (+20) degrees.    ASSESSMENT/PLAN  Updated problem list and treatment plan: Diagnosis 1:  R Shoulder Impingement.  Pain -  manual therapy, self management, education and home program  Decreased ROM/flexibility - manual therapy, therapeutic exercise, therapeutic activity and home program  Decreased strength - therapeutic exercise, therapeutic activities and home program  Inflammation - self management/home program  Impaired muscle performance - neuro re-education and home program  Decreased function - therapeutic activities and home program  STG/LTGs have been met or progress has been made towards goals:  Yes (See Goal flow sheet completed today.)  Assessment of Progress: The patient's condition is improving.  Patient is meeting short term goals and is progressing towards long term goals.  Self Management Plans:  Patient is independent in a home  treatment program.  Patient is independent in self management of symptoms.  I have re-evaluated this patient and find that the nature, scope, duration and intensity of the therapy is appropriate for the medical condition of the patient.  Liliana continues to require the following intervention to meet STG and LTG's:  PT intervention is no longer required to meet STG/LTG.    Recommendations:  This patient is ready to be discharged from therapy and continue their home treatment program.    Re: Liliana GONZALEZ Zulema   : 1970, page 2    Thank you for your referral.    INQUIRIES  Therapist: Estrella Castro PT (Peg)   [address]  [facility] [address]  Phone: [phone]  Fax: [phone]

## 2022-02-09 NOTE — PROGRESS NOTES
Subjective:  HPI  Physical Exam                    Objective:  System    Physical Exam    General     ROS    Assessment/Plan:    DISCHARGE REPORT    Progress reporting period is from 1-5-22 to 2-9-22.       SUBJECTIVE  Subjective changes noted by patient: Liliana reports that she is able to sleep on right side with less interruptions. ROM of right shoulder improving. Cheif c/o mild intermittent joint pain with sleep.  She is undergoing hand therapy for her right elbow injury.       Current Pain level: 0/10 (Night pain = 0-4/10).     Initial Pain level: 7/10.   Changes in function:  Yes (See Goal flowsheet attached for changes in current functional level)  Adverse reaction to treatment or activity: None    OBJECTIVE  Changes noted in objective findings:   Active R Shoulder Flex = 160 (+20) degrees.      ASSESSMENT/PLAN  Updated problem list and treatment plan: Diagnosis 1:  R Shoulder Impingement  Pain -  manual therapy, self management, education and home program  Decreased ROM/flexibility - manual therapy, therapeutic exercise, therapeutic activity and home program  Decreased strength - therapeutic exercise, therapeutic activities and home program  Inflammation - self management/home program  Impaired muscle performance - neuro re-education and home program  Decreased function - therapeutic activities and home program  STG/LTGs have been met or progress has been made towards goals:  Yes (See Goal flow sheet completed today.)  Assessment of Progress: The patient's condition is improving.  Patient is meeting short term goals and is progressing towards long term goals.  Self Management Plans:  Patient is independent in a home treatment program.  Patient is independent in self management of symptoms.  I have re-evaluated this patient and find that the nature, scope, duration and intensity of the therapy is appropriate for the medical condition of the patient.  Liliana continues to require the following intervention to  meet STG and LTG's:  PT intervention is no longer required to meet STG/LTG.    Recommendations:  This patient is ready to be discharged from therapy and continue their home treatment program.    Please refer to the daily flowsheet for treatment today, total treatment time and time spent performing 1:1 timed codes.

## 2022-02-12 ENCOUNTER — HEALTH MAINTENANCE LETTER (OUTPATIENT)
Age: 52
End: 2022-02-12

## 2022-04-09 ENCOUNTER — HEALTH MAINTENANCE LETTER (OUTPATIENT)
Age: 52
End: 2022-04-09

## 2022-08-02 ENCOUNTER — OFFICE VISIT (OUTPATIENT)
Dept: AUDIOLOGY | Facility: CLINIC | Age: 52
End: 2022-08-02
Payer: COMMERCIAL

## 2022-08-02 DIAGNOSIS — H90.3 SENSORINEURAL HEARING LOSS (SNHL) OF BOTH EARS: Primary | ICD-10-CM

## 2022-08-02 PROCEDURE — 92550 TYMPANOMETRY & REFLEX THRESH: CPT | Performed by: AUDIOLOGIST

## 2022-08-02 PROCEDURE — 92557 COMPREHENSIVE HEARING TEST: CPT | Performed by: AUDIOLOGIST

## 2022-08-02 NOTE — PROGRESS NOTES
AUDIOLOGY REPORT    SUMMARY: Audiology visit completed. See audiogram for results.    RECOMMENDATIONS: Follow-up with ENT.    Dinora Argueta  Doctor of Audiology  [place] License # [id number]

## 2022-08-17 ENCOUNTER — OFFICE VISIT (OUTPATIENT)
Dept: OTOLARYNGOLOGY | Facility: CLINIC | Age: 52
End: 2022-08-17
Payer: COMMERCIAL

## 2022-08-17 VITALS — SYSTOLIC BLOOD PRESSURE: 123 MMHG | HEART RATE: 77 BPM | DIASTOLIC BLOOD PRESSURE: 86 MMHG

## 2022-08-17 DIAGNOSIS — G43.809 VESTIBULAR MIGRAINE: Primary | ICD-10-CM

## 2022-08-17 PROCEDURE — 99204 OFFICE O/P NEW MOD 45 MIN: CPT | Performed by: OTOLARYNGOLOGY

## 2022-08-17 ASSESSMENT — ENCOUNTER SYMPTOMS
BRUISES/BLEEDS EASILY: 0
CONSTITUTIONAL NEGATIVE: 1
NAUSEA: 0
SORE THROAT: 0
TINGLING: 0
VOMITING: 0
SINUS PAIN: 0
HEARTBURN: 0
BLURRED VISION: 0
STRIDOR: 0
HEMOPTYSIS: 0
DIZZINESS: 1
PHOTOPHOBIA: 0
COUGH: 0
TREMORS: 0
DOUBLE VISION: 0
HEADACHES: 1

## 2022-08-17 NOTE — PROGRESS NOTES
Chief Complaint   Patient presents with     Consult     Left ear pain pressure, started after an episode of Bell's Palsy June 20th. Seen July 2nd in  for an L ear infection. Still having pressure in the left ear. Also Hx of Meniere's .

## 2022-08-17 NOTE — PROGRESS NOTES
HPI     This pleasant patient is having aural pressure in her left ear. Increase intensity since her Bell's palsy in June. Describes dull pain which is constant in her left with an intermittent sharp pain. States that tinnitus intensity also increased following her Bell's palsy in the left face. Denies any pulsating tinnitus, or otorrhea. She has a dx of Meniere's disease, vestibular Migraine with mainly auras and episodes of vertigo. No hx of weakness, numbness or tingling. She tried hydrochlorothiazide with limited benefit. She is properly treated for Bell's palsy.    Pt reports longstanding tinnitus bilaterally that has become louder recently. Also reports reports intermittent fullness in left. Reports Hx of episodes of dizziness. Previous results from 11/01/17 revealed normal to mild SNHL in the right and normal to moderately-severe SNHL in the left.  Results: WNL to moderate SNHL in the right. WNL to moderately-severe SNHL in the left. 100% word rec. bilaterally. Tymps WNL. Present 1 kHz ipsi/contra reflexes bilaterally.    Turner Buck MD - 06/20/2022   Formatting of this note might be different from the original.   EXAM: MRI BRAIN W/O CON     DATE: 6/20/2022 9:00 PM     CLINICAL DATA: Facial numbness/tingling.     COMPARISON: 10/17/2014.     TECHNIQUE: Noncontrast sagittal T1 FLAIR; axial diffusion, fat suppressed T2, FLAIR, T2*; coronal FLAIR.     FINDINGS: The ventricles remain normal in size and configuration. Normal brain volume. No hydrocephalus or evidence of increased intracranial pressure.     No mass, edema, hemorrhage, or recent ischemic stroke.     Few tiny foci of T2 signal hyperintensity in the frontal white matter are unchanged from before.     No extra-axial fluid collection. No diffusion restriction.     Sinuses and mastoids are clear. Arterial flow voids are maintained. No skull abnormality.     IMPRESSION   IMPRESSION:     1.  Near normal brain, unchanged since 2014. No acute  abnormality or finding to explain clinical symptoms.     2.  A few punctate T2 hyperintensities in the frontal white matter were present before. These are of doubtful clinical significance.       Review of Systems   Constitutional: Negative.    HENT: Positive for congestion, ear pain, hearing loss and tinnitus. Negative for ear discharge, nosebleeds, sinus pain and sore throat.    Eyes: Negative for blurred vision, double vision and photophobia.   Respiratory: Negative for cough, hemoptysis and stridor.    Gastrointestinal: Negative for heartburn, nausea and vomiting.   Skin: Negative.    Neurological: Positive for dizziness and headaches. Negative for tingling and tremors.   Endo/Heme/Allergies: Negative for environmental allergies. Does not bruise/bleed easily.         Physical Exam  Vitals reviewed.   Constitutional:       Appearance: Normal appearance.   HENT:      Head: Normocephalic and atraumatic.      Right Ear: Tympanic membrane, ear canal and external ear normal. Decreased hearing noted. No middle ear effusion. There is no impacted cerumen.      Left Ear: Tympanic membrane, ear canal and external ear normal. Decreased hearing noted.  No middle ear effusion. There is no impacted cerumen.      Nose: Nose normal.      Right Turbinates: Not enlarged or swollen.      Left Turbinates: Not enlarged or swollen.      Mouth/Throat:      Mouth: Mucous membranes are moist.      Pharynx: Oropharynx is clear. Uvula midline.   Eyes:      Extraocular Movements: Extraocular movements intact.      Pupils: Pupils are equal, round, and reactive to light.   Neurological:      Mental Status: She is alert.       A/P    This pleasant patient has moderate SNHL in the right. WNL to moderately-severe SNHL in the left. 100% word rec. bilaterally. Tymps WNL. Present 1 kHz ipsi/contra reflexes bilaterally. She continues to have aural pressure, tinnitus in her left ear and episodic dizzy spells. Her recent MRI is within normal limits. I  will request several vestibular tests including caloric test, ECochG, and VEMP and see her in the f/u.

## 2022-08-17 NOTE — LETTER
8/17/2022         RE: Liliana Poole  [id number] [address] [place] [id number]        Dear Colleague,    Thank you for referring your patient, Liliana Poole, to the [facility]. Please see a copy of my visit note below.    Chief Complaint   Patient presents with     Consult     Left ear pain pressure, started after an episode of Bell's Palsy June 20th. Seen July 2nd in  for an L ear infection. Still having pressure in the left ear. Also Hx of Meniere's .          HPI     This pleasant patient is having aural pressure in her left ear. Increase intensity since her Bell's palsy in June. Describes dull pain which is constant in her left with an intermittent sharp pain. States that tinnitus intensity also increased following her Bell's palsy in the left face. Denies any pulsating tinnitus, or otorrhea. She has a dx of Meniere's disease, vestibular Migraine with mainly auras and episodes of vertigo. No hx of weakness, numbness or tingling. She tried hydrochlorothiazide with limited benefit. She is properly treated for Bell's palsy.    Pt reports longstanding tinnitus bilaterally that has become louder recently. Also reports reports intermittent fullness in left. Reports Hx of episodes of dizziness. Previous results from 11/01/17 revealed normal to mild SNHL in the right and normal to moderately-severe SNHL in the left.  Results: WNL to moderate SNHL in the right. WNL to moderately-severe SNHL in the left. 100% word rec. bilaterally. Tymps WNL. Present 1 kHz ipsi/contra reflexes bilaterally.    Turner Buck MD - 06/20/2022   Formatting of this note might be different from the original.   EXAM: MRI BRAIN W/O CON     DATE: 6/20/2022 9:00 PM     CLINICAL DATA: Facial numbness/tingling.     COMPARISON: 10/17/2014.     TECHNIQUE: Noncontrast sagittal T1 FLAIR; axial diffusion, fat suppressed T2, FLAIR, T2*; coronal FLAIR.     FINDINGS: The ventricles remain normal in  size and configuration. Normal brain volume. No hydrocephalus or evidence of increased intracranial pressure.     No mass, edema, hemorrhage, or recent ischemic stroke.     Few tiny foci of T2 signal hyperintensity in the frontal white matter are unchanged from before.     No extra-axial fluid collection. No diffusion restriction.     Sinuses and mastoids are clear. Arterial flow voids are maintained. No skull abnormality.     IMPRESSION   IMPRESSION:     1.  Near normal brain, unchanged since 2014. No acute abnormality or finding to explain clinical symptoms.     2.  A few punctate T2 hyperintensities in the frontal white matter were present before. These are of doubtful clinical significance.       Review of Systems   Constitutional: Negative.    HENT: Positive for congestion, ear pain, hearing loss and tinnitus. Negative for ear discharge, nosebleeds, sinus pain and sore throat.    Eyes: Negative for blurred vision, double vision and photophobia.   Respiratory: Negative for cough, hemoptysis and stridor.    Gastrointestinal: Negative for heartburn, nausea and vomiting.   Skin: Negative.    Neurological: Positive for dizziness and headaches. Negative for tingling and tremors.   Endo/Heme/Allergies: Negative for environmental allergies. Does not bruise/bleed easily.         Physical Exam  Vitals reviewed.   Constitutional:       Appearance: Normal appearance.   HENT:      Head: Normocephalic and atraumatic.      Right Ear: Tympanic membrane, ear canal and external ear normal. Decreased hearing noted. No middle ear effusion. There is no impacted cerumen.      Left Ear: Tympanic membrane, ear canal and external ear normal. Decreased hearing noted.  No middle ear effusion. There is no impacted cerumen.      Nose: Nose normal.      Right Turbinates: Not enlarged or swollen.      Left Turbinates: Not enlarged or swollen.      Mouth/Throat:      Mouth: Mucous membranes are moist.      Pharynx: Oropharynx is clear. Uvula  midline.   Eyes:      Extraocular Movements: Extraocular movements intact.      Pupils: Pupils are equal, round, and reactive to light.   Neurological:      Mental Status: She is alert.       A/P    This pleasant patient has moderate SNHL in the right. WNL to moderately-severe SNHL in the left. 100% word rec. bilaterally. Tymps WNL. Present 1 kHz ipsi/contra reflexes bilaterally. She continues to have aural pressure, tinnitus in her left ear and episodic dizzy spells. Her recent MRI is within normal limits. I will request several vestibular tests including caloric test, ECochG, and VEMP and see her in the f/u.          Again, thank you for allowing me to participate in the care of your patient.        Sincerely,        Madiha Diaz MD

## 2022-08-17 NOTE — NURSING NOTE
Liliana Poole's chief complaint for this visit includes:  Chief Complaint   Patient presents with     Consult     Left ear pain pressure, started after an episode of Bell's Palsy June 20th. Seen July 2nd in [facility] for an L ear infection. Still having pressure in the left ear. Also Hx of Meniere's .      PCP: Francisca Alves    Referring Provider:  Referred Self, MD  No address on file    /86   Pulse 77   Data Unavailable        Allergies   Allergen Reactions     Contrast Dye Hives     Augmented Betamethasone Diprop [Betamethasone] Rash     Augmentin Rash     Hmg-Coa-R Inhibitors Muscle Pain (Myalgia)     All statin drugs     No Clinical Screening - See Comments Itching     Addition in Mantoux shot caused redness     Sulfa Drugs Nausea and Vomiting     Verapamil Rash         Do you need any medication refills at today's visit?

## 2022-10-06 ENCOUNTER — TELEPHONE (OUTPATIENT)
Dept: AUDIOLOGY | Facility: CLINIC | Age: 52
End: 2022-10-06

## 2022-10-06 ENCOUNTER — TELEPHONE (OUTPATIENT)
Dept: OTOLARYNGOLOGY | Facility: CLINIC | Age: 52
End: 2022-10-06

## 2022-10-06 NOTE — TELEPHONE ENCOUNTER
"Spoke to patient regarding upcoming evaluations and schedule. Reviewed test prep with the patient, making special note of avoiding the following medications two days prior to testing: Percocet, Zofran, Vistaril and Meclizine. Encouraged pt to bring the meclizine and zofran with to the appointment as patient was concerned that she would not be feeling well with the testing. Let the patient know that she could take these medications following completion of testing if she was not feeling well. Pt reports undergoing similar testing in her 20s and recalls significant nausea and \"crawling\" out of testing. Okayed eating a small amount of food prior to testing if needed. Reviewed avoiding caffeine, nicotine and alcohol as well. Answered all questions to the patient's satisfaction.    Patient notes that her symptoms have been worse since her Bell's Palsy.     Юлия Fletcher. CCC-A  Licensed Audiologist   [place] #[id number]    "

## 2022-10-06 NOTE — TELEPHONE ENCOUNTER
[facility] Call Center    Phone Message    May a detailed message be left on voicemail: yes     Reason for Call: Other: Patient stated she could not find the itinerary for her upcoming procedure.  Wondering if someone can give her a call and go over that information verbally.  Thank you!     Action Taken: Message routed to:  [facility] VIRAL    Travel Screening: Not Applicable

## 2022-10-06 NOTE — TELEPHONE ENCOUNTER
"\"I m calling from the Audiology and Balance Testing department at the . This is just a call to remind you of your upcoming Balance Testing appointment on [Date], and to see if you have any questions or concerns regarding the balance testing you'll be doing. You should have received an itinerary via mail or via [employer], if you are active, that goes over what to expect and explains the dos and don ts both 48 hours before, and the day of. There is a list of medications for you to review on the itinerary that we would like you to stop taking beforehand. If you didn t receive the itinerary or you still have questions, please give our clinic a call at [phone]. Otherwise, we will see you on [Date] starting at [Time].\"    Please send encounter if patient would like to reschedule.  "

## 2022-10-09 ENCOUNTER — HEALTH MAINTENANCE LETTER (OUTPATIENT)
Age: 52
End: 2022-10-09

## 2022-10-10 NOTE — CONFIDENTIAL NOTE
AUDIOLOGY REPORT    SUBJECTIVE: Liliana Poole was seen in the Audiology Clinic at the [facility] on 10/11/2022 for a vestibular evoked myogenic potential (VEMP) evaluation, referred by Madiha Diaz M.D.. Liliana reports ***.     Hearing evaluations have revealed normal sloping to moderate sensorineural hearing loss in the right ear, and normal sloping to moderately-severe sensorineural hearing loss in the left ear. {They were accompanied today by their {ACCOMPANIED BY (ADULT):316850}.}    OBJECTIVE:   Abuse Screening:  Do you feel unsafe at home or work/school? {Audioyesnounabletoanswer:182573}  Do you feel threatened by someone? {Audioyesnounabletoanswer:522363}  Does anyone try to keep you from having contact with others, or doing things outside of your home? {Audioyesnounabletoanswer:047830}  Physical signs of abuse present? {Audioyesdescriptionof findings:889658}    VEMP testing is performed using an auditory evoked potentials system. Surface electrodes are placed in several locations on the patient's head and neck in order to record muscle motoneuron activity in response to loud auditory stimuli. This testing assesses very specific vestibular pathways in the inner ear and central nervous system. cVEMP testing is performed with electrodes placed on the patient's sternocleidomastoid muscle, and is used to assess the saccular organ, afferent inferior vestibular nerve and vestibular nuclei within the brainstem. oVEMP testing is performed with electrodes placed under the patient's eyes, and is used to assess the utricle and afferent superior vestibular nerve and nuclei within the brainstem.  Patients that may benefit from this procedure are those with complaints of vertigo and imbalance or those suspected of superior canal dehiscence. A total of 90 minutes was spent completing the VEMP testing today.    Tympanograms      RIGHT: {TYMPANOGRAM  FINDINGS:995361}.     LEFT: {TYMPANOGRAM FINDINGS:943360}    cVEMP Thresholds via 500 Hz toneburst:    RIGHT: *** dB nHL which  {CVEMP INTERPRETATION:283964}    LEFT: *** dB nHL which  {CVEMP INTERPRETATION:173329}    AMPLITUDE ASYMMETRY: *** (greater than 33% is considered abnormal)    oVEMP Thresholds via 500 Hz toneburst:     RIGHT: *** dB nHL which suggests {OVEMP INTERPRETATION:767917}    LEFT: *** dB nHL which suggests {OVEMP INTERPRETATION:520511}    AMPLITUDE ASYMMETRY: *** (greater than 33% is considered abnormal)    ASSESSMENT: Today's results suggest {VEMP FINDINGS:360749}. These results {CVEMP INTERPRETATION:274870} and {OVEMP INTERPRETATION:122979}.      PLAN:The patient will follow up with Madiha Diaz M.D. for medical management.     Please call this clinic with questions regarding these results or recommendations.      Юлия Ramesh.  Licensed Audiologist  [place] # [id number]

## 2022-10-10 NOTE — PROGRESS NOTES
"AUDIOLOGY REPORT    BACKGROUND INFORMATION: Liliana Poole was seen in Audiology at the [facility] on 10/11/2022 for an electrocochleography (ECochG) evaluation, referred by Madiha Diaz M.D.  Patient presents with chief complaints of episodes of dizziness, imbalance, and nausea. Patient reports dizzy episodes can last hours, but the feelings of imbalance and nausea can last days. Patient reports nausea can lead to vomiting but not always. Patient reports quick movements and darkness tend to make her symptoms worse. Patient was unsure of anything that triggered her symptoms. Patient reports nothing alleviates her symptoms but meclizine can help. Patient reports when she is walking, especially during a dizzy spell, she tends to veer to the left. Patient reports a history of migraines since about age 13. With her migraines, patient reports visual disturbances, speech disturbances, and auditory disturbances-sounds like she is \"listening in a tunnel\". Patient reports her head constantly \"feels full\", more so on her left side. Patient reports left aural fullness/pressure that increases when she lays on that side. Patient reports in her 20's, she started experiencing vertigo spells intermittently which brought her to Dr. Lai's clinic who reportedly diagnosed her with Meniere's disease. Patient reports her mother was diagnosed with Meniere's disease years ago. Patient reports 4-5 years ago, she was diagnosed with vestibular migraines for which she saw vestibular physical therapy 1-2 times and she felt it helped a little. Patient reports in June 2022, she experienced Bell's Palsy on her left side along with increased left tinnitus, otalgia, and pressure. Since then her dizziness and nausea have also worsened. At that time, patient reports being put on steroids and antivirals which she feels helped her hearing.     A hearing evaluation completed 8/2/2022 " revealed normal hearing sloping to moderate sensorineural hearing loss in the right ear and normal hearing sloping to moderately-severe sensorineural hearing loss in the left ear. Patient reports during her episodes, she feels her left hearing decreases and her left tinnitus increases. Patient reports she is bothered by loud sounds. Patient reports intermittent sharp ear pain in her left ear. Patient denies a history of ear infections, however, was recently diagnosed with right otitis media in July which has since resolved with antibiotics. Patient denies drainage or previous ear surgery.    Patient reports a diagnosis of patent foraminal ovale (PFO) for which she is monitored. Patient reports during her migraines, she is sensitive to lights and loud sounds. Patient denies dizziness with loud sounds. Patient reports severe motion intolerance that can result in vomiting. Patient reports 2 head injuries in childhood, one at age 13 where she fell off a slide and blacked out and another a few years later where she hit her head against another child's head. Patient reports occasionally when she is still, it feels like she is rotating. Patient denies dizziness with coughing/sneezing/blowing her nose and with lifting heavy things or bearing down. Patient reports only wearing glasses for driving. Denies blurred or double vision. Patient reports PRK surgery on one eye, she was unsure which one. Patient denies a history of cancer or chemotherapy.     TEST RESULTS AND PROCEDURES:   Abuse Screening:  Do you feel unsafe at home or work/school? No  Do you feel threatened by someone? No  Does anyone try to keep you from having contact with others, or doing things outside of your home? No  Physical signs of abuse present? No    Electrocochleography (ECochG) testing is performed using an auditory evoked potentials system.  Surface electrodes are placed behind the test ear with extratympanic tymptrode placed in the test ear in order  to obtain a near-field recording that measures the response of the cochlear hair cells and auditory nerve in response to auditory stimuli. The measured response consists of the summating potential (SP) and the cochlear nerve action potential (AP). Abnormalities may take the form of an increased summating potential/compound action potential (SP/AP) ratio (due to an increased summating potential) in patients suspected of Meniere's disease (endolymphatic hydrops) or in those patients who have symptoms of vestibular dysfunction or ear symptoms including asymmetric or fluctuating hearing loss, tinnitus and/or aural fullness. The following can be suggestive of an abnormal EcochG: SP/AP ratio exceeds 0.43.  Tympanograms showed normal eardrum mobility bilaterally. Using a microscope tympanic membranes were visualized.       A two-channel ECochG recording was performed for clicks bilaterally.  Clicks for the right ear showed normal SP/AP ratios.    Clicks for the left ear showed normal SP/AP ratios.         Click SP/AP ratio   Right ear  0.209   Left ear  0.325     Abnormal SP/AP ratios must be greater than .43 for clicks.     SUMMARY AND RECOMMENDATIONS: Today s ECochG revealed normal SP/AP ratios.  Please call this clinic with questions regarding today s results.  Follow-up with Dr. Diaz for medical management.      Jovanna Valencia M.A.   Audiology Doctoral Student   [place] #[id number]      I was present with the patient for the entire Audiology appointment including all procedures/testing performed by the AuD student, and agree with the student s assessment and plan as documented.    Юлия Ramesh.  Licensed Audiologist  [place] # [id number]

## 2022-10-10 NOTE — CONFIDENTIAL NOTE
"AUDIOLOGY REPORT-BALANCE ASSESSMENT    SUBJECTIVE: Liliana Poole, 52 year old, was seen in Audiology at the [facility] on 10/11/2022, for videonystagmography (VNG), referred by Madiha Burciaga M.D.  The patient reports a history of Meniere's disease and vestibular migraines. .      Hearing evaluations have revealed ***.  *** has not taken any antivestibular medications in the past 48 hours.    OBJECTIVE:  Abuse Screening:  Do you feel unsafe at home or work/school? {Audioyesnounabletoanswer:145873}  Do you feel threatened by someone? {Audioyesnounabletoanswer:443757}  Does anyone try to keep you from having contact with others, or doing things outside of your home? {Audioyesnounabletoanswer:557421}  Physical signs of abuse present? {Audioyesdescriptionof findings:265849}    Dizziness Handicap Inventory (DHI): ***/100 *** perceived impairment    Videonystagmography (VNG) testing:  Prescreening:  Tympanograms: {TYMPANOGRAM FINDINGS:863322}. Note: this test is completed to determine the status of the middle ear before irrigations are completed.  Ocular range of motion and ocular counter roll: {NORMAL:724300::\"Normal\"}  Cross/cover:{NORMAL:174361::\"Normal\"}  Head Thrust: {POSITIVE (CAP):445847} {to the {Right Left:185825}}    Nystagmus Tests:  Gaze-Horizontal with fixation:   Center: {NORMAL:279496::\"Normal\"}   Right: {NORMAL:095965::\"Normal\"}   Left: {NORMAL:290750::\"Normal\"}  Gaze-Vertical with fixation:   Up: {NORMAL:356292::\"Normal\"}   Down: {NORMAL:801488::\"Normal\"}  Gaze with fixation denied:   Center: {NORMAL:960447::\"Normal\"}   Right: {NORMAL:149435::\"Normal\"}   Left: {NORMAL:844105::\"Normal\"}   Up: {NORMAL:426128::\"Normal\"}  High Frequency Headshake:   Horizontal: {POSITIVE (CAP):911313}   Vertical: {POSITIVE (CAP):854841}    Fistula test Right ear: {NORMAL:207700::\"Normal\"}  Fistula test Left ear: {NORMAL:973722::\"Normal\"}  Valsalva maneuver against closed " "nostrils: {NORMAL:006416::\"Normal\"}  Valsalva maneuver against closed glottis: {NORMAL:382018::\"Normal\"}    Trina-Hallpike Head Right: Negative for PC-BPPV. No nystagmus or symptoms   Trina-Hallpike Head Left: {NEGATIVE:038361::\"negative\"} for PC-BPPV. No nystagmus or symptoms   Roll Test Head Right: {NEGATIVE:644059::\"negative\"} for HC-BPPV. No nystagmus or symptoms   Roll Test Head Left: {NEGATIVE:244193::\"negative\"} for HC-BPPV. No nystagmus or symptoms     Positional Testing:  Positionals: Supine: {NORMAL:423564::\"Normal\"}  Positionals: Body Right: {NORMAL:962462::\"Normal\"}  Positionals: Body Left: {NORMAL:627965::\"Normal\"}  Positionals: Pre-Caloric: {NORMAL:604097::\"Normal\"}    Oculomotor Tests:  Saccades: {NORMAL:102607::\"Normal\"}  Anti-saccades: {NORMAL:963564::\"Normal\"}; Patient able to perform task  Pursuit: {NORMAL:993707::\"Normal\"}  Optokinetics: {NORMAL:796219::\"Normal\"}    Calorics :  (Tested at 44 degrees and 30 degrees Celsius for 30 seconds for warm and cool water, respectively):  Right Warm Eye Speed: *** degrees per second right beating  Left Warm Eye Speed: *** degrees per second left beating  Right Cool Eye Speed: *** degrees per second left beating  Left Cool Eye Speed: *** degrees per second right beating  Difference between ear: ***% {RIGHT/LEFT:772331} hypofunction. (Greater than 25% considered clinically significant.)  Fixation Index: *** {NORMAL:831381::\"Normal\"}  Overall caloric test: {NORMAL:766906::\"Normal\"}    Post-Calorics Otoscopy: Normal    ASSESSMENT:  1. There were no significant indications of central vestibular system involvement noted on today's exam.   1. Indications of central vestibular system involvement noted on today's exam were as follows:   ***    2. There were no significant indications of peripheral vestibular system involvement noted on today's exam.   2. Indications of peripheral vestibular system involvement noted on today's exam were as follows: "   ***    PLAN:  Follow-up with Dr. Diaz regarding today's results and for medical management.  Please call this clinic at [phone] with questions regarding these results or recommendations.       Юлия Ramesh.  Licensed Audiologist  [place] # [id number]

## 2022-10-11 ENCOUNTER — OFFICE VISIT (OUTPATIENT)
Dept: AUDIOLOGY | Facility: CLINIC | Age: 52
End: 2022-10-11
Payer: COMMERCIAL

## 2022-10-11 DIAGNOSIS — G43.809 VESTIBULAR MIGRAINE: ICD-10-CM

## 2022-10-11 DIAGNOSIS — R42 DIZZINESS AND GIDDINESS: Primary | ICD-10-CM

## 2022-10-11 PROCEDURE — 92567 TYMPANOMETRY: CPT | Performed by: AUDIOLOGIST

## 2022-10-11 PROCEDURE — 92584 ELECTROCOCHLEOGRAPHY: CPT | Performed by: AUDIOLOGIST

## 2022-10-11 PROCEDURE — 92519 VEMP TST I&R CERVICAL&OCULAR: CPT | Performed by: AUDIOLOGIST

## 2022-10-11 PROCEDURE — 92542 POSITIONAL NYSTAGMUS TEST: CPT | Mod: 59 | Performed by: AUDIOLOGIST

## 2022-10-11 PROCEDURE — 92541 SPONTANEOUS NYSTAGMUS TEST: CPT | Performed by: AUDIOLOGIST

## 2022-10-11 PROCEDURE — 92545 OSCILLATING TRACKING TEST: CPT | Mod: 59 | Performed by: AUDIOLOGIST

## 2022-10-11 PROCEDURE — 92537 CALORIC VSTBLR TEST W/REC: CPT | Performed by: AUDIOLOGIST

## 2022-10-11 NOTE — PROGRESS NOTES
"AUDIOLOGY REPORT    SUBJECTIVE: Liliana Poole was seen in the Audiology Clinic at the [facility] on 10/11/2022 for a vestibular evoked myogenic potential (VEMP) evaluation, referred by Madiha Diaz M.D. Patient presents with chief complaints of episodes of dizziness, imbalance, and nausea. Patient reports dizzy episodes can last hours, but the feelings of imbalance and nausea can last days. Patient reports nausea can lead to vomiting but not always. Patient reports quick movements and darkness tend to make her symptoms worse. Patient was unsure of anything that triggered her symptoms. Patient reports nothing alleviates her symptoms but meclizine can help. Patient reports when she is walking, especially during a dizzy spell, she tends to veer to the left. Patient reports a history of migraines since about age 13. With her migraines, patient reports visual disturbances, speech disturbances, and auditory disturbances-sounds like she is \"listening in a tunnel\". Patient reports her head constantly \"feels full\", more so on her left side. Patient reports left aural fullness/pressure that increases when she lays on that side. Patient reports in her 20's, she started experiencing vertigo spells intermittently which brought her to Dr. Lai's clinic who reportedly diagnosed her with Meniere's disease. Patient reports her mother was diagnosed with Meniere's disease years ago. Patient reports 4-5 years ago, she was diagnosed with vestibular migraines for which she saw vestibular physical therapy 1-2 times and she felt it helped a little. Patient reports in June 2022, she experienced Bell's Palsy on her left side along with increased left tinnitus, otalgia, and pressure. Since then her dizziness and nausea have also worsened. At that time, patient reports being put on steroids and antivirals which she feels helped her hearing.      A hearing evaluation " completed 8/2/2022 revealed normal hearing sloping to moderate sensorineural hearing loss in the right ear and normal hearing sloping to moderately-severe sensorineural hearing loss in the left ear. Patient reports during her episodes, she feels her left hearing decreases and her left tinnitus increases. Patient reports she is bothered by loud sounds. Patient reports intermittent sharp ear pain in her left ear. Patient denies a history of ear infections, however, was recently diagnosed with right otitis media in July which has since resolved with antibiotics. Patient denies drainage or previous ear surgery.     Patient reports a diagnosis of patent foraminal ovale (PFO) for which she is monitored. Patient reports during her migraines, she is sensitive to lights and loud sounds. Patient denies dizziness with loud sounds. Patient reports severe motion intolerance that can result in vomiting. Patient reports 2 head injuries in childhood, one at age 13 where she fell off a slide and blacked out and another a few years later where she hit her head against another child's head. Patient reports occasionally when she is still, it feels like she is rotating. Patient denies dizziness with coughing/sneezing/blowing her nose and with lifting heavy things or bearing down. Patient reports only wearing glasses for driving. Denies blurred or double vision. Patient reports PRK surgery on one eye, she was unsure which one. Patient denies a history of cancer or chemotherapy.     OBJECTIVE:   Abuse Screening:  Do you feel unsafe at home or work/school? No  Do you feel threatened by someone? No  Does anyone try to keep you from having contact with others, or doing things outside of your home? No  Physical signs of abuse present? No    VEMP testing is performed using an auditory evoked potentials system. Surface electrodes are placed in several locations on the patient's head and neck in order to record muscle motoneuron activity in  response to loud auditory stimuli. This testing assesses very specific vestibular pathways in the inner ear and central nervous system. cVEMP testing is performed with electrodes placed on the patient's sternocleidomastoid muscle, and is used to assess the saccular organ, afferent inferior vestibular nerve and vestibular nuclei within the brainstem. oVEMP testing is performed with electrodes placed under the patient's eyes, and is used to assess the utricle and afferent superior vestibular nerve and nuclei within the brainstem.  Patients that may benefit from this procedure are those with complaints of vertigo and imbalance or those suspected of superior canal dehiscence. A total of 90 minutes was spent completing the VEMP testing today.    Tympanograms : performed during [facility] evaluation prior to VEMP     RIGHT: normal eardrum mobility      LEFT: normal eardrum mobility     cVEMP Thresholds via 500 Hz toneburst:    RIGHT: 80 dB nHL which  suggest normal saccular and inferior vestibular nerve function    LEFT: 85 dB nHL which  suggest normal saccular and inferior vestibular nerve function    AMPLITUDE ASYMMETRY: 1%; Normal (greater than 33% is considered abnormal)    oVEMP Thresholds via 500 Hz toneburst:     RIGHT: 90 dB nHL which suggests normal utricle and superior vestibular nerve function    LEFT: 97 dB nHL which suggests normal utricle and superior vestibular nerve function    AMPLITUDE ASYMMETRY: 22%; Normal (greater than 33% is considered abnormal)    ASSESSMENT: Today's results suggest a normal VEMP evaluation. These results suggest normal saccular and inferior vestibular nerve function and normal utricle and superior vestibular nerve function.      PLAN: The patient will follow up with Madiha Diaz M.D. for medical management. Please call this clinic with questions regarding these results or recommendations.      Jovanna Valencia M.A.   Audiology Doctoral Student   [place] #[id number]        I was present  with the patient for the entire Audiology appointment including all procedures/testing performed by the AuD student, and agree with the student s assessment and plan as documented.      Юлия Ramesh.  Licensed Audiologist  [place] # [id number]

## 2022-10-11 NOTE — Clinical Note
Javier Diaz-  Vestibular testing completed earlier this week: VEMP and Ecog were normal bilaterally. VNG had mild central findings, but no significant peripheral findings. I am suspecting her symptoms are mostly vestibular migraine related, and not Menieres? She would likely benefit from vestibular PT for symptom reduction along with migraine management. See my notes for details  Thanks, Nataly

## 2022-10-11 NOTE — PROGRESS NOTES
"AUDIOLOGY REPORT-BALANCE ASSESSMENT    SUBJECTIVE: Liliana Poole, 52 year old, was seen in Audiology at the [facility] [place] on 10/11/2022, for videonystagmography (VNG) referred by Dr. Madiha Diaz M.D. Patient presents with chief complaints of episodes of dizziness, imbalance, and nausea. Patient reports dizzy episodes can last hours, but the feelings of imbalance and nausea can last days. Patient reports nausea can lead to vomiting but not always. Patient reports quick movements and darkness tend to make her symptoms worse. Patient was unsure of anything that triggered her symptoms. Patient reports nothing alleviates her symptoms but meclizine can help. Patient reports when she is walking, especially during a dizzy spell, she tends to veer to the left. Patient reports a history of migraines since about age 13. With her migraines, patient reports visual disturbances, speech disturbances, and auditory disturbances-sounds like she is \"listening in a tunnel\". Patient reports her head constantly \"feels full\", more so on her left side. Patient reports left aural fullness/pressure that increases when she lays on that side. Patient reports in her 20's, she started experiencing vertigo spells intermittently which brought her to Dr. Lai's clinic who reportedly diagnosed her with Meniere's disease. Patient reports her mother was diagnosed with Meniere's disease years ago. Patient reports 4-5 years ago, she was diagnosed with vestibular migraines for which she saw vestibular physical therapy 1-2 times and she felt it helped a little. Patient reports in June 2022, she experienced Bell's Palsy on her left side along with increased left tinnitus, otalgia, and pressure. Since then her dizziness and nausea have also worsened. At that time, patient reports being put on steroids and antivirals which she feels helped her hearing.     A hearing evaluation completed " 8/2/2022 revealed normal hearing sloping to moderate sensorineural hearing loss in the right ear and normal hearing sloping to moderately-severe sensorineural hearing loss in the left ear. Patient reports during her episodes, she feels her left hearing decreases and her left tinnitus increases. Patient reports she is bothered by loud sounds. Patient reports intermittent sharp ear pain in her left ear. Patient denies a history of ear infections, however, was recently diagnosed with right otitis media in July which has since resolved with antibiotics. Patient denies drainage or previous ear surgery.    Patient reports a diagnosis of patent foraminal ovale (PFO) for which she is monitored. Patient reports during her migraines, she is sensitive to lights and loud sounds. Patient denies dizziness with loud sounds. Patient reports severe motion intolerance that can result in vomiting. Patient reports 2 head injuries in childhood, one at age 13 where she fell off a slide and blacked out and another a few years later where she hit her head against another child's head. Patient reports occasionally when she is still, it feels like she is rotating. Patient denies dizziness with coughing/sneezing/blowing her nose and with lifting heavy things or bearing down. Patient reports only wearing glasses for driving. Denies blurred or double vision. Patient reports PRK surgery on one eye, she was unsure which one. Patient denies a history of cancer or chemotherapy.     Patient denies consumption of caffeinated beverages, nicotine, alcoholic substances, or use of medications with known vestibular interactions within the past 48 hours.    OBJECTIVE:  Abuse Screening:  Do you feel unsafe at home or work/school? No  Do you feel threatened by someone? No  Does anyone try to keep you from having contact with others, or doing things outside of your home? No  Physical signs of abuse present? No    Dizziness Handicap Inventory (DHI): 48/100;  moderate perceived impairment    Videonystagmography (VNG) testing:  Prescreening:  Tympanograms: normal eardrum mobility bilaterally (tested during [facility] appointment earlier today). Note: this test is completed to determine the status of the middle ear before irrigations are completed.   Ocular range of motion and ocular counter roll: Normal  Cross/cover: Normal  Head Thrust: Negative     Nystagmus Tests:  Gaze-Horizontal with Fixation:   Center: Normal   Right: Normal   Left: Normal  Gaze-Vertical with Fixation:   Up: Normal   Down: Normal  Gaze with Fixation Denied   Center: Normal   Right: 2 degrees/s right beating nystagmus (likely endpoint/nonsignificant)   Left: 2 to 3 degrees/s left beating nystagmus (likely endpoint/nonsignificant)   Up: Abnormal: 4 degrees/s up beating nystagmus  High Frequency Headshake:   Horizontal: Negative.  No nystagmus.  Patient reported sensation of continued motion post headshake.   Vertical: Negative.  No nystagmus.  Patient reported feeling queasy post head shake, and continued motion for the first few seconds.    Trina-Hallpike Head Right: negative for PC BPPV.  No nystagmus.  Patient reported lightheadedness and pulled to the right upon sitting.  Trina-Hallpike Head Left: negative for PC BPPV.  No nystagmus.  Patient reported lightheadedness/spinning and feeling of being pulled to the left upon sitting.  Roll Test Head Right: negative for HC BPPV.  No nystagmus or symptoms.  Roll Test Head Left: negative for HC BPPV.  No nystagmus or symptoms.    Positional Testing:  Positionals: Supine: Normal  Positionals: Body Right: Intermittent 3 degrees/s right beating nystagmus  Positionals: Body Left: Normal  Positionals: Pre-Caloric: Normal  **Patient reported feeling queasy throughout Positionals    Oculomotor Tests:  Saccades: Normal  Anti-saccades: Normal; patient able to perform task  Pursuit: Abnormal: Poor morphology and gain in the higher frequencies.  Repeat Pursuit: Borderline  abnormal in the higher frequencies    Calorics:  (Tested at 44 degrees and 30 degrees Celsius for 30 seconds for warm and cool water, respectively):  Right Warm Eye Speed: 29 degrees per second right beating  Left Warm Eye Speed: 43 degrees per second left beating  Right Cool Eye Speed: 6 degrees per second left beating  Repeat Right Cool Eye Speed: 7 degrees/s left beating  Left Cool Eye Speed: 16 degrees per second right beating  Repeat Left Cool Eye Speed: Unable to use data, as patient became ill and recording was stopped early  Difference between ear: 24% right hypofunction. (Greater than 25% considered clinically significant.)  Fixation Index: Normal   Overall caloric test: Borderline normal    Post Irrigations Otoscopy: Normal    ASSESSMENT:    1. Indications of central vestibular system involvement noted on today's exam were as follows:   - Borderline abnormal Pursuit testing; repeatable  - Up beating nystagmus present in 1/4 gaze with fixation denied    2. There were no significant indications of peripheral vestibular system involvement noted on today's exam.       PLAN:  Follow-up with Dr. Diaz regarding today's results and for medical management. Please call this clinic at [phone] with questions regarding these results or recommendations.       Jovanna Valencia M.A.  Audiology Doctoral Extern  [place] #[id number]    I was present with the patient for the entire Audiology appointment including all procedures/testing performed by the AuD student, and agree with the student s assessment and plan as documented.      Юлия Ramesh.  Licensed Audiologist  [place] # [id number]

## 2022-10-25 ENCOUNTER — OFFICE VISIT (OUTPATIENT)
Dept: OTOLARYNGOLOGY | Facility: CLINIC | Age: 52
End: 2022-10-25
Payer: COMMERCIAL

## 2022-10-25 VITALS — HEART RATE: 78 BPM | DIASTOLIC BLOOD PRESSURE: 79 MMHG | SYSTOLIC BLOOD PRESSURE: 129 MMHG

## 2022-10-25 DIAGNOSIS — H69.92 DYSFUNCTION OF LEFT EUSTACHIAN TUBE: ICD-10-CM

## 2022-10-25 DIAGNOSIS — H81.02 COCHLEAR HYDROPS, LEFT: ICD-10-CM

## 2022-10-25 DIAGNOSIS — G43.809 VESTIBULAR MIGRAINE: Primary | ICD-10-CM

## 2022-10-25 PROCEDURE — 99214 OFFICE O/P EST MOD 30 MIN: CPT | Performed by: OTOLARYNGOLOGY

## 2022-10-25 RX ORDER — MECLIZINE HYDROCHLORIDE 25 MG/1
25 TABLET ORAL 3 TIMES DAILY PRN
Qty: 40 TABLET | Refills: 3 | Status: SHIPPED | OUTPATIENT
Start: 2022-10-25

## 2022-10-25 RX ORDER — FLUTICASONE PROPIONATE 50 MCG
1-2 SPRAY, SUSPENSION (ML) NASAL DAILY
Qty: 16 G | Refills: 4 | Status: SHIPPED | OUTPATIENT
Start: 2022-10-25

## 2022-10-25 NOTE — PROGRESS NOTES
Chief Complaint   Patient presents with     Follow Up     Vestibular testing done. Symptoms worse now than 2 months ago.    HPI     This pleasant patient is having aural pressure in her left ear. States tendency to fall to her left from time to time.  Increase intensity since her Bell's palsy in June. Describes dull pain which is constant in her left with an intermittent sharp pain. States that tinnitus intensity also increased following her Bell's palsy in the left face. Denies any pulsating tinnitus, or otorrhea. She has a dx of Meniere's disease, vestibular Migraine with mainly auras and episodes of vertigo. No hx of weakness, numbness or tingling. She tried hydrochlorothiazide with limited benefit. She is properly treated for Bell's palsy.    Pt reports longstanding tinnitus bilaterally that has become louder recently. Also reports reports intermittent fullness in left. Reports Hx of episodes of dizziness. Previous results from 11/01/17 revealed normal to mild SNHL in the right and normal to moderately-severe SNHL in the left.  Results: WNL to moderate SNHL in the right. WNL to moderately-severe SNHL in the left. 100% word rec. bilaterally. Tymps WNL. Present 1 kHz ipsi/contra reflexes bilaterally.    EXAM: MRI BRAIN W/O CON     DATE: 6/20/2022 9:00 PM     CLINICAL DATA: Facial numbness/tingling.     COMPARISON: 10/17/2014.     TECHNIQUE: Noncontrast sagittal T1 FLAIR; axial diffusion, fat suppressed T2, FLAIR, T2*; coronal FLAIR.     FINDINGS: The ventricles remain normal in size and configuration. Normal brain volume. No hydrocephalus or evidence of increased intracranial pressure.     No mass, edema, hemorrhage, or recent ischemic stroke.     Few tiny foci of T2 signal hyperintensity in the frontal white matter are unchanged from before.     No extra-axial fluid collection. No diffusion restriction.     Sinuses and mastoids are clear. Arterial flow voids are maintained. No skull abnormality.     IMPRESSION    IMPRESSION:     1.  Near normal brain, unchanged since 2014. No acute abnormality or finding to explain clinical symptoms.     2.  A few punctate T2 hyperintensities in the frontal white matter were present before. These are of doubtful clinical significance.   Videonystagmography (VNG) testing:  Prescreening:  Tympanograms: normal eardrum mobility bilaterally (tested during [facility] appointment earlier today). Note: this test is completed to determine the status of the middle ear before irrigations are completed.   Ocular range of motion and ocular counter roll: Normal  Cross/cover: Normal  Head Thrust: Negative      Nystagmus Tests:  Gaze-Horizontal with Fixation:              Center: Normal              Right: Normal              Left: Normal  Gaze-Vertical with Fixation:              Up: Normal              Down: Normal  Gaze with Fixation Denied              Center: Normal              Right: 2 degrees/s right beating nystagmus (likely endpoint/nonsignificant)              Left: 2 to 3 degrees/s left beating nystagmus (likely endpoint/nonsignificant)              Up: Abnormal: 4 degrees/s up beating nystagmus  High Frequency Headshake:              Horizontal: Negative.  No nystagmus.  Patient reported sensation of continued motion post headshake.              Vertical: Negative.  No nystagmus.  Patient reported feeling queasy post head shake, and continued motion for the first few seconds.     Trina-Hallpike Head Right: negative for PC BPPV.  No nystagmus.  Patient reported lightheadedness and pulled to the right upon sitting.  Trina-Hallpike Head Left: negative for PC BPPV.  No nystagmus.  Patient reported lightheadedness/spinning and feeling of being pulled to the left upon sitting.  Roll Test Head Right: negative for HC BPPV.  No nystagmus or symptoms.  Roll Test Head Left: negative for HC BPPV.  No nystagmus or symptoms.     Positional Testing:  Positionals: Supine: Normal  Positionals: Body Right:  Intermittent 3 degrees/s right beating nystagmus  Positionals: Body Left: Normal  Positionals: Pre-Caloric: Normal  **Patient reported feeling queasy throughout Positionals     Oculomotor Tests:  Saccades: Normal  Anti-saccades: Normal; patient able to perform task  Pursuit: Abnormal: Poor morphology and gain in the higher frequencies.  Repeat Pursuit: Borderline abnormal in the higher frequencies     Calorics:  (Tested at 44 degrees and 30 degrees Celsius for 30 seconds for warm and cool water, respectively):  Right Warm Eye Speed: 29 degrees per second right beating  Left Warm Eye Speed: 43 degrees per second left beating  Right Cool Eye Speed: 6 degrees per second left beating  Repeat Right Cool Eye Speed: 7 degrees/s left beating  Left Cool Eye Speed: 16 degrees per second right beating  Repeat Left Cool Eye Speed: Unable to use data, as patient became ill and recording was stopped early  Difference between ear: 24% right hypofunction. (Greater than 25% considered clinically significant.)  Fixation Index: Normal   Overall caloric test: Borderline normal     Post Irrigations Otoscopy: Normal     ASSESSMENT:     1. Indications of central vestibular system involvement noted on today's exam were as follows:   - Borderline abnormal Pursuit testing; repeatable  - Up beating nystagmus present in 1/4 gaze with fixation denied     2. There were no significant indications of peripheral vestibular system involvement noted on today's exam.     Review of Systems   Constitutional: Negative.    HENT: Positive for congestion, ear pain, hearing loss and tinnitus. Negative for ear discharge, nosebleeds, sinus pain and sore throat.    Eyes: Negative for blurred vision, double vision and photophobia.   Respiratory: Negative for cough, hemoptysis and stridor.    Gastrointestinal: Negative for heartburn, nausea and vomiting.   Skin: Negative.    Neurological: Positive for dizziness and headaches. Negative for tingling and tremors.    Endo/Heme/Allergies: Negative for environmental allergies. Does not bruise/bleed easily.         Physical Exam  Vitals reviewed.   Constitutional:       Appearance: Normal appearance.   HENT:      Head: Normocephalic and atraumatic.      Right Ear: Tympanic membrane, ear canal and external ear normal. Decreased hearing noted. No middle ear effusion. There is no impacted cerumen.      Left Ear: Tympanic membrane, ear canal and external ear normal. Decreased hearing noted.  No middle ear effusion. There is no impacted cerumen.      Nose: Nose normal.      Right Turbinates: Not enlarged or swollen.      Left Turbinates: Not enlarged or swollen.      Mouth/Throat:      Mouth: Mucous membranes are moist.      Pharynx: Oropharynx is clear. Uvula midline.   Eyes:      Extraocular Movements: Extraocular movements intact.      Pupils: Pupils are equal, round, and reactive to light.   Neurological:      Mental Status: She is alert.       A/P    This pleasant patient has moderate SNHL in the right. WNL to moderately-severe SNHL in the left. 100% word rec. bilaterally. Tymps WNL. Present 1 kHz ipsi/contra reflexes bilaterally. She continues to have aural pressure, tinnitus in her left ear and episodic dizzy spells. Her recent MRI is within normal limits. Her caloric test, [facility], and [facility] are within normal limits although caloric showed 24% hypofunction in her right ear. Options were discussed. I will regulate her Eustachian tube functions with topical Fluticasone nasal spray and refer her to vestibular rehab and see her in the f/u.

## 2022-10-25 NOTE — NURSING NOTE
Liliana Poole's chief complaint for this visit includes:  Chief Complaint   Patient presents with     Follow Up     Vestibular testing done. Symptoms worse now than 2 months ago.      PCP: Francisca Alves    Referring Provider:  No referring provider defined for this encounter.    /79   Pulse 78   Data Unavailable        Allergies   Allergen Reactions     Contrast Dye Hives     Augmented Betamethasone Diprop [Betamethasone] Rash     Augmentin Rash     Hmg-Coa-R Inhibitors Muscle Pain (Myalgia)     All statin drugs     No Clinical Screening - See Comments Itching     Addition in Mantoux shot caused redness     Sulfa Drugs Nausea and Vomiting     Verapamil Rash         Do you need any medication refills at today's visit?

## 2022-10-25 NOTE — LETTER
10/25/2022         RE: Liliana Poole  [id number] [address] [place] [id number]        Dear Colleague,    Thank you for referring your patient, Liliana Poole, to the [facility]. Please see a copy of my visit note below.    Chief Complaint   Patient presents with     Follow Up     Vestibular testing done. Symptoms worse now than 2 months ago.    HPI     This pleasant patient is having aural pressure in her left ear. States tendency to fall to her left from time to time.  Increase intensity since her Bell's palsy in June. Describes dull pain which is constant in her left with an intermittent sharp pain. States that tinnitus intensity also increased following her Bell's palsy in the left face. Denies any pulsating tinnitus, or otorrhea. She has a dx of Meniere's disease, vestibular Migraine with mainly auras and episodes of vertigo. No hx of weakness, numbness or tingling. She tried hydrochlorothiazide with limited benefit. She is properly treated for Bell's palsy.    Pt reports longstanding tinnitus bilaterally that has become louder recently. Also reports reports intermittent fullness in left. Reports Hx of episodes of dizziness. Previous results from 11/01/17 revealed normal to mild SNHL in the right and normal to moderately-severe SNHL in the left.  Results: WNL to moderate SNHL in the right. WNL to moderately-severe SNHL in the left. 100% word rec. bilaterally. Tymps WNL. Present 1 kHz ipsi/contra reflexes bilaterally.    EXAM: MRI BRAIN W/O CON     DATE: 6/20/2022 9:00 PM     CLINICAL DATA: Facial numbness/tingling.     COMPARISON: 10/17/2014.     TECHNIQUE: Noncontrast sagittal T1 FLAIR; axial diffusion, fat suppressed T2, FLAIR, T2*; coronal FLAIR.     FINDINGS: The ventricles remain normal in size and configuration. Normal brain volume. No hydrocephalus or evidence of increased intracranial pressure.     No mass, edema, hemorrhage, or recent ischemic stroke.      Few tiny foci of T2 signal hyperintensity in the frontal white matter are unchanged from before.     No extra-axial fluid collection. No diffusion restriction.     Sinuses and mastoids are clear. Arterial flow voids are maintained. No skull abnormality.     IMPRESSION   IMPRESSION:     1.  Near normal brain, unchanged since 2014. No acute abnormality or finding to explain clinical symptoms.     2.  A few punctate T2 hyperintensities in the frontal white matter were present before. These are of doubtful clinical significance.   Videonystagmography (VNG) testing:  Prescreening:  Tympanograms: normal eardrum mobility bilaterally (tested during [facility] appointment earlier today). Note: this test is completed to determine the status of the middle ear before irrigations are completed.   Ocular range of motion and ocular counter roll: Normal  Cross/cover: Normal  Head Thrust: Negative      Nystagmus Tests:  Gaze-Horizontal with Fixation:              Center: Normal              Right: Normal              Left: Normal  Gaze-Vertical with Fixation:              Up: Normal              Down: Normal  Gaze with Fixation Denied              Center: Normal              Right: 2 degrees/s right beating nystagmus (likely endpoint/nonsignificant)              Left: 2 to 3 degrees/s left beating nystagmus (likely endpoint/nonsignificant)              Up: Abnormal: 4 degrees/s up beating nystagmus  High Frequency Headshake:              Horizontal: Negative.  No nystagmus.  Patient reported sensation of continued motion post headshake.              Vertical: Negative.  No nystagmus.  Patient reported feeling queasy post head shake, and continued motion for the first few seconds.     Trina-Hallpike Head Right: negative for PC BPPV.  No nystagmus.  Patient reported lightheadedness and pulled to the right upon sitting.  [place]-Hallpike Head Left: negative for PC BPPV.  No nystagmus.  Patient reported lightheadedness/spinning and feeling of  being pulled to the left upon sitting.  Roll Test Head Right: negative for HC BPPV.  No nystagmus or symptoms.  Roll Test Head Left: negative for HC BPPV.  No nystagmus or symptoms.     Positional Testing:  Positionals: Supine: Normal  Positionals: Body Right: Intermittent 3 degrees/s right beating nystagmus  Positionals: Body Left: Normal  Positionals: Pre-Caloric: Normal  **Patient reported feeling queasy throughout Positionals     Oculomotor Tests:  Saccades: Normal  Anti-saccades: Normal; patient able to perform task  Pursuit: Abnormal: Poor morphology and gain in the higher frequencies.  Repeat Pursuit: Borderline abnormal in the higher frequencies     Calorics:  (Tested at 44 degrees and 30 degrees Celsius for 30 seconds for warm and cool water, respectively):  Right Warm Eye Speed: 29 degrees per second right beating  Left Warm Eye Speed: 43 degrees per second left beating  Right Cool Eye Speed: 6 degrees per second left beating  Repeat Right Cool Eye Speed: 7 degrees/s left beating  Left Cool Eye Speed: 16 degrees per second right beating  Repeat Left Cool Eye Speed: Unable to use data, as patient became ill and recording was stopped early  Difference between ear: 24% right hypofunction. (Greater than 25% considered clinically significant.)  Fixation Index: Normal   Overall caloric test: Borderline normal     Post Irrigations Otoscopy: Normal     ASSESSMENT:     1. Indications of central vestibular system involvement noted on today's exam were as follows:   - Borderline abnormal Pursuit testing; repeatable  - Up beating nystagmus present in 1/4 gaze with fixation denied     2. There were no significant indications of peripheral vestibular system involvement noted on today's exam.     Review of Systems   Constitutional: Negative.    HENT: Positive for congestion, ear pain, hearing loss and tinnitus. Negative for ear discharge, nosebleeds, sinus pain and sore throat.    Eyes: Negative for blurred vision,  double vision and photophobia.   Respiratory: Negative for cough, hemoptysis and stridor.    Gastrointestinal: Negative for heartburn, nausea and vomiting.   Skin: Negative.    Neurological: Positive for dizziness and headaches. Negative for tingling and tremors.   Endo/Heme/Allergies: Negative for environmental allergies. Does not bruise/bleed easily.         Physical Exam  Vitals reviewed.   Constitutional:       Appearance: Normal appearance.   HENT:      Head: Normocephalic and atraumatic.      Right Ear: Tympanic membrane, ear canal and external ear normal. Decreased hearing noted. No middle ear effusion. There is no impacted cerumen.      Left Ear: Tympanic membrane, ear canal and external ear normal. Decreased hearing noted.  No middle ear effusion. There is no impacted cerumen.      Nose: Nose normal.      Right Turbinates: Not enlarged or swollen.      Left Turbinates: Not enlarged or swollen.      Mouth/Throat:      Mouth: Mucous membranes are moist.      Pharynx: Oropharynx is clear. Uvula midline.   Eyes:      Extraocular Movements: Extraocular movements intact.      Pupils: Pupils are equal, round, and reactive to light.   Neurological:      Mental Status: She is alert.       A/P    This pleasant patient has moderate SNHL in the right. WNL to moderately-severe SNHL in the left. 100% word rec. bilaterally. Tymps WNL. Present 1 kHz ipsi/contra reflexes bilaterally. She continues to have aural pressure, tinnitus in her left ear and episodic dizzy spells. Her recent MRI is within normal limits. Her caloric test, [facility], and [facility] are within normal limits although caloric showed 24% hypofunction in her right ear. Options were discussed. I will regulate her Eustachian tube functions with topical Fluticasone nasal spray and refer her to vestibular rehab and see her in the f/u.            Again, thank you for allowing me to participate in the care of your patient.        Sincerely,        Madiha Diaz,  MD

## 2022-11-07 ENCOUNTER — HOSPITAL ENCOUNTER (OUTPATIENT)
Dept: PHYSICAL THERAPY | Facility: CLINIC | Age: 52
Setting detail: THERAPIES SERIES
Discharge: HOME | End: 2022-11-07
Attending: OTOLARYNGOLOGY
Payer: COMMERCIAL

## 2022-11-07 DIAGNOSIS — G43.809 VESTIBULAR MIGRAINE: ICD-10-CM

## 2022-11-07 PROCEDURE — 97530 THERAPEUTIC ACTIVITIES: CPT | Mod: GP | Performed by: PHYSICAL THERAPIST

## 2022-11-07 PROCEDURE — 97162 PT EVAL MOD COMPLEX 30 MIN: CPT | Mod: GP | Performed by: PHYSICAL THERAPIST

## 2022-11-08 NOTE — PROGRESS NOTES
11/07/22 1300   Quick Adds   Quick Adds Vestibular Eval  (session began at)   Type of Visit Initial OP PT Evaluation   General Information   Start of Care Date 11/07/22   Referring Physician Madiha Diaz MD   Orders Evaluate and Treat as Indicated   Order Date 10/25/22   Medical Diagnosis Vestibular migraine (G43.809)  - Primary   Onset of illness/injury or Date of Surgery 11/07/18  (3-4 years ago sx worsened)   Precautions/Limitations no known precautions/limitations   Surgical/Medical history reviewed Yes  (hearing loss, hx of Meniere's, hyperlipidemia, migraines)   Pertinent history of current problem (include personal factors and/or comorbidities that impact the POC) Pt reports she has had migraines with auras since age 13 but never any with a vestibular quality. She was dx with Meniere s Disease by Dr. Lai and has seen several neuro and ENT providers since then. Sx have been worsening over the last 3-4 years but things worsened further since she had Bell s Palsy in June of 2022. She had a severe episode of vomiting, imbalance/leaning to the L about 2-4 weeks ago. She reports another severe episode that was likely triggered by a long car ride and she experienced 2 hours of vomiting. Head movements can make her sx worse sometimes as can air/wind in her ears. She has severe ringing in her L ear. Reports hx of car sickness for years. She has SNHL in B ears. Sx are constant and she rates sx a 6/10 now and describes a  semi-dizzy , queasy feeling and like she is leaning L. She has never tried her rescue migraine meds for her severe vestibular sx. She is going to try every other day amitriptyline and has been doing a nasal spray. She doesn t have regular HA but does have them, reports some neck pain/stiffness today rated a 3/10. She has intermittent hypersensitivity to sounds and reports that her depth perception can be off when she is walking. Her balance is very poor with EC. She wears glasses  primarily only for noc-time driving. 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 She is to be considered for tubes in her ears when she goes back to the ENT in 3 mos.   Previous/Current Treatment Physical Therapy  (hx of PT for numerous issues including cervical pain/some vestibular interventions)   Patient/Family Goals Statement stop the sx   Fall Risk Screen   Fall screen completed by PT   Have you fallen 2 or more times in the past year? No   Have you fallen and had an injury in the past year? No   Is patient a fall risk? No   System Outcome Measures   Outcome Measures BPPV   Dizziness Handicap Inventory (score out of 100) A decrease in score by 17.18 or greater indicates a clinically significant change in symptoms. 46   Cognitive Status Examination   Orientation orientation to person, place and time   Level of Consciousness alert   Follows Commands and Answers Questions able to follow multistep instructions   Personal Safety and Judgment intact   Memory intact   Cognitive Comment no issues noted   Observation   Observation no acute distress, pt is a detailed historian, attends session alone   Posture   Posture Comments kyphotic, rounded SH   Gait   Gait Comments no AD, veers L at times; walking with head up and down and side to side both increase the veering from a straight path   Balance Special Tests   Balance Special Tests Modified CTSIB Conditions   Balance Special Tests Modified CTSIB Conditions   Condition 1, seconds 30 Seconds   Condition 2, seconds 0 Seconds   Condition 4, seconds 3 Seconds   Condition 5, seconds 0 Seconds   Modified CTSIB Comments shoes on   Muscle Tone   Muscle Tone no deficits were identified   Infrared Goggle Exam or Frenzel Lense Exam   Exam completed with Room Light   Spontaneous Nystagmus comments negative   Dynamic Visual Acuity (DVA)   Static Acuity (LogMar) line 7 (20/25)   Horizontal Head Movement at 1 Hz (LogMar)  line 5 (25/40)   Horizontal Head Movement at 2 Hz (LogMar) line 4 (25/50)   DVA Comments pt not wearing glasses, had to put the eye chart at foot distance; reports dizziness increase at 2 Hz > 1 Hz, no significant increase in nausea   Planned Therapy Interventions   Planned Therapy Interventions neuromuscular re-education;stretching;strengthening;ROM   Planned Therapy Interventions Comment vestibular rehab, possible manual therapy, postural education   Clinical Impression   Criteria for Skilled Therapeutic Interventions Met yes, treatment indicated   PT Diagnosis dizziness, imbalance, cervical pain, impaired gaze stability   Influenced by the following impairments dizziness/vertigo, imbalance   Functional limitations due to impairments walking, work duties, ADLs   Clinical Presentation Evolving/Changing   Clinical Presentation Rationale clinical judgement, DHI score, chronicity, multiple medical co-morbidities   Clinical Decision Making (Complexity) Moderate complexity   Therapy Frequency 1 time/week   Predicted Duration of Therapy Intervention (days/wks) over 60 days as needed   Risk & Benefits of therapy have been explained Yes   Patient, Family & other staff in agreement with plan of care Yes   Clinical Impression Comments Pt presents to PT with a vestibular dysfunction - possibly from vestibular migraines. She has a hx of migraines, Meniere s Disease, cervical pain and had Bell s Palsy a few mos ago. Prior vestibular testing revealed some central vestibular system involvement and a borderline hypofunction in the R ear. Skilled PT services are recommended to instruct in vestibular rehab strategies, provide balance interventions and address cervical spine/posture as needed to help pt improve her QOL.   Education Assessment   Preferred Learning Style Listening;Reading;Demonstration   Barriers to Learning No barriers   GOALS   PT Eval Goals 1;2   Goal 1   Goal Identifier 1 DHI   Goal Description Pt will improve her  score on the DHI (Dizziness Handicap Inventory) to 25% or less indicating a clinically meaningful improvement in self-perception of dizziness over time.   Target Date 02/04/23   Goal 2   Goal Identifier 2 DVA   Goal Description Pt will improve to reading 20/40 or better on the Dynamic Visual Acuity test at a speed of 2 Hz in order to note improved gaze stability with driving and shopping   Target Date 02/04/23   Total Evaluation Time   PT Eval, Moderate Complexity Minutes ([id number]) 36

## 2022-11-16 ENCOUNTER — HOSPITAL ENCOUNTER (OUTPATIENT)
Dept: PHYSICAL THERAPY | Facility: CLINIC | Age: 52
Setting detail: THERAPIES SERIES
Discharge: HOME | End: 2022-11-16
Attending: OTOLARYNGOLOGY
Payer: COMMERCIAL

## 2022-11-16 PROCEDURE — 97112 NEUROMUSCULAR REEDUCATION: CPT | Mod: GP | Performed by: PHYSICAL THERAPIST

## 2022-12-28 ENCOUNTER — HOSPITAL ENCOUNTER (OUTPATIENT)
Dept: PHYSICAL THERAPY | Facility: CLINIC | Age: 52
Setting detail: THERAPIES SERIES
Discharge: HOME | End: 2022-12-28
Attending: OTOLARYNGOLOGY
Payer: COMMERCIAL

## 2022-12-28 PROCEDURE — 97112 NEUROMUSCULAR REEDUCATION: CPT | Mod: GP | Performed by: PHYSICAL THERAPIST

## 2023-05-21 ENCOUNTER — HEALTH MAINTENANCE LETTER (OUTPATIENT)
Age: 53
End: 2023-05-21

## 2023-09-15 ENCOUNTER — OFFICE VISIT (OUTPATIENT)
Dept: AUDIOLOGY | Facility: CLINIC | Age: 53
End: 2023-09-15
Payer: COMMERCIAL

## 2023-09-15 ENCOUNTER — OFFICE VISIT (OUTPATIENT)
Dept: OTOLARYNGOLOGY | Facility: CLINIC | Age: 53
End: 2023-09-15
Payer: COMMERCIAL

## 2023-09-15 VITALS — DIASTOLIC BLOOD PRESSURE: 92 MMHG | HEART RATE: 59 BPM | SYSTOLIC BLOOD PRESSURE: 133 MMHG

## 2023-09-15 DIAGNOSIS — H90.3 SENSORINEURAL HEARING LOSS (SNHL) OF BOTH EARS: Primary | ICD-10-CM

## 2023-09-15 DIAGNOSIS — H69.92 DYSFUNCTION OF LEFT EUSTACHIAN TUBE: ICD-10-CM

## 2023-09-15 DIAGNOSIS — G43.809 VESTIBULAR MIGRAINE: ICD-10-CM

## 2023-09-15 DIAGNOSIS — H81.12 BENIGN PAROXYSMAL POSITIONAL VERTIGO, LEFT: Primary | ICD-10-CM

## 2023-09-15 DIAGNOSIS — H81.02 COCHLEAR HYDROPS, LEFT: ICD-10-CM

## 2023-09-15 PROCEDURE — 99214 OFFICE O/P EST MOD 30 MIN: CPT | Performed by: OTOLARYNGOLOGY

## 2023-09-15 PROCEDURE — 92550 TYMPANOMETRY & REFLEX THRESH: CPT | Performed by: AUDIOLOGIST

## 2023-09-15 PROCEDURE — 92557 COMPREHENSIVE HEARING TEST: CPT | Performed by: AUDIOLOGIST

## 2023-09-15 NOTE — NURSING NOTE
Liliana Poole's chief complaint for this visit includes:  Chief Complaint   Patient presents with    Follow Up     Vestibular migraine, has been to rehab, some help. Also Flonase has helped ear pressure. One week ago had Vertigo. Today ear pressure and Left is worse. Also Stratton palsy flared up.      PCP: Francisca Alves    Referring Provider:  Referred Self, MD  No address on file    BP (!) 133/92   Pulse 59

## 2023-09-15 NOTE — PROGRESS NOTES
Chief Complaint   Patient presents with    Follow Up     Vestibular testing done. Symptoms worse now than 2 months ago.      HPI     Liliana Poole's chief complaint for this visit includes:  Chief Complaint   Patient presents with    Follow Up     Vestibular migraine, has been to rehab, some help. Also Flonase has helped ear pressure. One week ago had Vertigo. Today ear pressure and Left is worse. Also [place] palsy flared up.      PCP: Francisca Alves    Referring Provider:  Referred Self, MD  No address on file    BP (!) 133/92   Pulse 59         This pleasant patient is here for the f/up. I can see that she had another episode of vertigo a week ago with aural pressure in her left ear. She feels positional changes aggravate her dizzy spells. States tendency to fall to her left from time to time.  She has a hx of Bell's palsy but feels numbness in her left face for the past a couple of weeks again.   Describes dull pain which is constant in her left with an intermittent sharp pain. States that tinnitus intensity also increased following her Bell's palsy in the left face. Denies any pulsating tinnitus, or otorrhea. She has a dx of Meniere's disease, vestibular Migraine with mainly auras and episodes of vertigo. No hx of weakness, numbness or tingling. She tried hydrochlorothiazide with limited benefit. She is properly treated for Bell's palsy.    Pt reports longstanding tinnitus bilaterally that has become louder recently. Also reports reports intermittent fullness in left. Reports Hx of episodes of dizziness. Previous results from 11/01/17 revealed normal to mild SNHL in the right and normal to moderately-severe SNHL in the left.  Results: WNL to moderate SNHL in the right. WNL to moderately-severe SNHL in the left. 100% word rec. bilaterally. Tymps WNL. Present 1 kHz ipsi/contra reflexes bilaterally.    EXAM: MRI BRAIN W/O CON     DATE: 6/20/2022 9:00 PM     CLINICAL DATA: Facial numbness/tingling.      COMPARISON: 10/17/2014.     TECHNIQUE: Noncontrast sagittal T1 FLAIR; axial diffusion, fat suppressed T2, FLAIR, T2*; coronal FLAIR.     FINDINGS: The ventricles remain normal in size and configuration. Normal brain volume. No hydrocephalus or evidence of increased intracranial pressure.     No mass, edema, hemorrhage, or recent ischemic stroke.     Few tiny foci of T2 signal hyperintensity in the frontal white matter are unchanged from before.     No extra-axial fluid collection. No diffusion restriction.     Sinuses and mastoids are clear. Arterial flow voids are maintained. No skull abnormality.     IMPRESSION   IMPRESSION:     1.  Near normal brain, unchanged since 2014. No acute abnormality or finding to explain clinical symptoms.     2.  A few punctate T2 hyperintensities in the frontal white matter were present before. These are of doubtful clinical significance.   Videonystagmography (VNG) testing:  Prescreening:  Tympanograms: normal eardrum mobility bilaterally (tested during [facility] appointment earlier today). Note: this test is completed to determine the status of the middle ear before irrigations are completed.   Ocular range of motion and ocular counter roll: Normal  Cross/cover: Normal  Head Thrust: Negative      Nystagmus Tests:  Gaze-Horizontal with Fixation:              Center: Normal              Right: Normal              Left: Normal  Gaze-Vertical with Fixation:              Up: Normal              Down: Normal  Gaze with Fixation Denied              Center: Normal              Right: 2 degrees/s right beating nystagmus (likely endpoint/nonsignificant)              Left: 2 to 3 degrees/s left beating nystagmus (likely endpoint/nonsignificant)              Up: Abnormal: 4 degrees/s up beating nystagmus  High Frequency Headshake:              Horizontal: Negative.  No nystagmus.  Patient reported sensation of continued motion post headshake.              Vertical: Negative.  No nystagmus.   Patient reported feeling queasy post head shake, and continued motion for the first few seconds.     [place]-Hallpike Head Right: negative for PC BPPV.  No nystagmus.  Patient reported lightheadedness and pulled to the right upon sitting.  [place]-Hallpike Head Left: negative for PC BPPV.  No nystagmus.  Patient reported lightheadedness/spinning and feeling of being pulled to the left upon sitting.  Roll Test Head Right: negative for HC BPPV.  No nystagmus or symptoms.  Roll Test Head Left: negative for HC BPPV.  No nystagmus or symptoms.     Positional Testing:  Positionals: Supine: Normal  Positionals: Body Right: Intermittent 3 degrees/s right beating nystagmus  Positionals: Body Left: Normal  Positionals: Pre-Caloric: Normal  **Patient reported feeling queasy throughout Positionals     Oculomotor Tests:  Saccades: Normal  Anti-saccades: Normal; patient able to perform task  Pursuit: Abnormal: Poor morphology and gain in the higher frequencies.  Repeat Pursuit: Borderline abnormal in the higher frequencies     Calorics:  (Tested at 44 degrees and 30 degrees Celsius for 30 seconds for warm and cool water, respectively):  Right Warm Eye Speed: 29 degrees per second right beating  Left Warm Eye Speed: 43 degrees per second left beating  Right Cool Eye Speed: 6 degrees per second left beating  Repeat Right Cool Eye Speed: 7 degrees/s left beating  Left Cool Eye Speed: 16 degrees per second right beating  Repeat Left Cool Eye Speed: Unable to use data, as patient became ill and recording was stopped early  Difference between ear: 24% right hypofunction. (Greater than 25% considered clinically significant.)  Fixation Index: Normal   Overall caloric test: Borderline normal     Post Irrigations Otoscopy: Normal     ASSESSMENT:     1. Indications of central vestibular system involvement noted on today's exam were as follows:   - Borderline abnormal Pursuit testing; repeatable  - Up beating nystagmus present in 1/4 gaze with  fixation denied     2. There were no significant indications of peripheral vestibular system involvement noted on today's exam.     Review of Systems   Constitutional: Negative.    HENT: Positive for congestion, ear pain, hearing loss and tinnitus. Negative for ear discharge, nosebleeds, sinus pain and sore throat.    Eyes: Negative for blurred vision, double vision and photophobia.   Respiratory: Negative for cough, hemoptysis and stridor.    Gastrointestinal: Negative for heartburn, nausea and vomiting.   Skin: Negative.    Neurological: Positive for dizziness and headaches. Negative for tingling and tremors.   Endo/Heme/Allergies: Negative for environmental allergies. Does not bruise/bleed easily.     Physical Exam  Vitals reviewed.   Constitutional:       Appearance: Normal appearance.   HENT:      Head: Normocephalic and atraumatic.      Right Ear: Tympanic membrane, ear canal and external ear normal. Decreased hearing noted. No middle ear effusion. There is no impacted cerumen.      Left Ear: Tympanic membrane, ear canal and external ear normal. Decreased hearing noted.  No middle ear effusion. There is no impacted cerumen.      Nose: Nose normal.      Right Turbinates: Not enlarged or swollen.      Left Turbinates: Not enlarged or swollen.      Mouth/Throat:      Mouth: Mucous membranes are moist.      Pharynx: Oropharynx is clear. Uvula midline.   Eyes:      Extraocular Movements: Extraocular movements intact.      Pupils: Pupils are equal, round, and reactive to light.   Neurological:      Mental Status: She is alert.       A/P    This pleasant patient has moderate SNHL in the right. WNL to moderately-severe SNHL in the left. 100% word rec. bilaterally. Tymps WNL. Present 1 kHz ipsi/contra reflexes bilaterally. She continues to have aural pressure, tinnitus in her left ear and episodic dizzy spells. Her recent MRI is within normal limits. Her caloric test, [facility], and [facility] are within normal limits although  caloric showed 24% hypofunction in her right ear. Options including further imaging, intratympanic Dexam and PE tube insertion were discussed. I will regulate her Eustachian tube functions with topical Fluticasone nasal spray and refer her to vestibular rehab and see her in the f/up for a consideration of an MRI to check trigeminal and facial nerves.

## 2023-09-15 NOTE — LETTER
9/15/2023         RE: Liliana Poole  [id number] [address] [place] [id number]        Dear Colleague,    Thank you for referring your patient, Liliana Poole, to the [facility]. Please see a copy of my visit note below.    Chief Complaint   Patient presents with     Follow Up     Vestibular testing done. Symptoms worse now than 2 months ago.      HPI     Liliana Poole's chief complaint for this visit includes:  Chief Complaint   Patient presents with     Follow Up     Vestibular migraine, has been to rehab, some help. Also Flonase has helped ear pressure. One week ago had Vertigo. Today ear pressure and Left is worse. Also [place] palsy flared up.      PCP: Francisca Alves    Referring Provider:  Referred Self, MD  No address on file    BP (!) 133/92   Pulse 59         This pleasant patient is here for the f/up. I can see that she had another episode of vertigo a week ago with aural pressure in her left ear. She feels positional changes aggravate her dizzy spells. States tendency to fall to her left from time to time.  She has a hx of Bell's palsy but feels numbness in her left face for the past a couple of weeks again.   Describes dull pain which is constant in her left with an intermittent sharp pain. States that tinnitus intensity also increased following her Bell's palsy in the left face. Denies any pulsating tinnitus, or otorrhea. She has a dx of Meniere's disease, vestibular Migraine with mainly auras and episodes of vertigo. No hx of weakness, numbness or tingling. She tried hydrochlorothiazide with limited benefit. She is properly treated for Bell's palsy.    Pt reports longstanding tinnitus bilaterally that has become louder recently. Also reports reports intermittent fullness in left. Reports Hx of episodes of dizziness. Previous results from 11/01/17 revealed normal to mild SNHL in the right and normal to moderately-severe SNHL in the  left.  Results: WNL to moderate SNHL in the right. WNL to moderately-severe SNHL in the left. 100% word rec. bilaterally. Tymps WNL. Present 1 kHz ipsi/contra reflexes bilaterally.    EXAM: MRI BRAIN W/O CON     DATE: 6/20/2022 9:00 PM     CLINICAL DATA: Facial numbness/tingling.     COMPARISON: 10/17/2014.     TECHNIQUE: Noncontrast sagittal T1 FLAIR; axial diffusion, fat suppressed T2, FLAIR, T2*; coronal FLAIR.     FINDINGS: The ventricles remain normal in size and configuration. Normal brain volume. No hydrocephalus or evidence of increased intracranial pressure.     No mass, edema, hemorrhage, or recent ischemic stroke.     Few tiny foci of T2 signal hyperintensity in the frontal white matter are unchanged from before.     No extra-axial fluid collection. No diffusion restriction.     Sinuses and mastoids are clear. Arterial flow voids are maintained. No skull abnormality.     IMPRESSION   IMPRESSION:     1.  Near normal brain, unchanged since 2014. No acute abnormality or finding to explain clinical symptoms.     2.  A few punctate T2 hyperintensities in the frontal white matter were present before. These are of doubtful clinical significance.   Videonystagmography (VNG) testing:  Prescreening:  Tympanograms: normal eardrum mobility bilaterally (tested during [facility] appointment earlier today). Note: this test is completed to determine the status of the middle ear before irrigations are completed.   Ocular range of motion and ocular counter roll: Normal  Cross/cover: Normal  Head Thrust: Negative      Nystagmus Tests:  Gaze-Horizontal with Fixation:              Center: Normal              Right: Normal              Left: Normal  Gaze-Vertical with Fixation:              Up: Normal              Down: Normal  Gaze with Fixation Denied              Center: Normal              Right: 2 degrees/s right beating nystagmus (likely endpoint/nonsignificant)              Left: 2 to 3 degrees/s left beating nystagmus  (likely endpoint/nonsignificant)              Up: Abnormal: 4 degrees/s up beating nystagmus  High Frequency Headshake:              Horizontal: Negative.  No nystagmus.  Patient reported sensation of continued motion post headshake.              Vertical: Negative.  No nystagmus.  Patient reported feeling queasy post head shake, and continued motion for the first few seconds.     [place]-Hallpike Head Right: negative for PC BPPV.  No nystagmus.  Patient reported lightheadedness and pulled to the right upon sitting.  [place]-Hallpike Head Left: negative for PC BPPV.  No nystagmus.  Patient reported lightheadedness/spinning and feeling of being pulled to the left upon sitting.  Roll Test Head Right: negative for HC BPPV.  No nystagmus or symptoms.  Roll Test Head Left: negative for HC BPPV.  No nystagmus or symptoms.     Positional Testing:  Positionals: Supine: Normal  Positionals: Body Right: Intermittent 3 degrees/s right beating nystagmus  Positionals: Body Left: Normal  Positionals: Pre-Caloric: Normal  **Patient reported feeling queasy throughout Positionals     Oculomotor Tests:  Saccades: Normal  Anti-saccades: Normal; patient able to perform task  Pursuit: Abnormal: Poor morphology and gain in the higher frequencies.  Repeat Pursuit: Borderline abnormal in the higher frequencies     Calorics:  (Tested at 44 degrees and 30 degrees Celsius for 30 seconds for warm and cool water, respectively):  Right Warm Eye Speed: 29 degrees per second right beating  Left Warm Eye Speed: 43 degrees per second left beating  Right Cool Eye Speed: 6 degrees per second left beating  Repeat Right Cool Eye Speed: 7 degrees/s left beating  Left Cool Eye Speed: 16 degrees per second right beating  Repeat Left Cool Eye Speed: Unable to use data, as patient became ill and recording was stopped early  Difference between ear: 24% right hypofunction. (Greater than 25% considered clinically significant.)  Fixation Index: Normal   Overall caloric  test: Borderline normal     Post Irrigations Otoscopy: Normal     ASSESSMENT:     1. Indications of central vestibular system involvement noted on today's exam were as follows:   - Borderline abnormal Pursuit testing; repeatable  - Up beating nystagmus present in 1/4 gaze with fixation denied     2. There were no significant indications of peripheral vestibular system involvement noted on today's exam.     Review of Systems   Constitutional: Negative.    HENT: Positive for congestion, ear pain, hearing loss and tinnitus. Negative for ear discharge, nosebleeds, sinus pain and sore throat.    Eyes: Negative for blurred vision, double vision and photophobia.   Respiratory: Negative for cough, hemoptysis and stridor.    Gastrointestinal: Negative for heartburn, nausea and vomiting.   Skin: Negative.    Neurological: Positive for dizziness and headaches. Negative for tingling and tremors.   Endo/Heme/Allergies: Negative for environmental allergies. Does not bruise/bleed easily.     Physical Exam  Vitals reviewed.   Constitutional:       Appearance: Normal appearance.   HENT:      Head: Normocephalic and atraumatic.      Right Ear: Tympanic membrane, ear canal and external ear normal. Decreased hearing noted. No middle ear effusion. There is no impacted cerumen.      Left Ear: Tympanic membrane, ear canal and external ear normal. Decreased hearing noted.  No middle ear effusion. There is no impacted cerumen.      Nose: Nose normal.      Right Turbinates: Not enlarged or swollen.      Left Turbinates: Not enlarged or swollen.      Mouth/Throat:      Mouth: Mucous membranes are moist.      Pharynx: Oropharynx is clear. Uvula midline.   Eyes:      Extraocular Movements: Extraocular movements intact.      Pupils: Pupils are equal, round, and reactive to light.   Neurological:      Mental Status: She is alert.       A/P    This pleasant patient has moderate SNHL in the right. WNL to moderately-severe SNHL in the left. 100%  word rec. bilaterally. Tymps WNL. Present 1 kHz ipsi/contra reflexes bilaterally. She continues to have aural pressure, tinnitus in her left ear and episodic dizzy spells. Her recent MRI is within normal limits. Her caloric test, [facility], and [facility] are within normal limits although caloric showed 24% hypofunction in her right ear. Options including further imaging, intratympanic Dexam and PE tube insertion were discussed. I will regulate her Eustachian tube functions with topical Fluticasone nasal spray and refer her to vestibular rehab and see her in the f/up for a consideration of an MRI to check trigeminal and facial nerves.         Liliana Poole's chief complaint for this visit includes:  Chief Complaint   Patient presents with     Follow Up     Vestibular migraine, has been to rehab, some help. Also Flonase has helped ear pressure. One week ago had Vertigo. Today ear pressure and Left is worse. Also [place] palsy flared up.      PCP: Francisca Alves    Referring Provider:  Referred Self, MD  No address on file    BP (!) 133/92   Pulse 59             Again, thank you for allowing me to participate in the care of your patient.        Sincerely,        Madiha Diaz MD

## 2023-09-15 NOTE — PROGRESS NOTES
AUDIOLOGY REPORT    SUMMARY: Audiology visit completed. See audiogram for results.    RECOMMENDATIONS: Follow-up with ENT.    Dinora Argueta  Doctor of Audiology  [place] License # [id number]

## 2023-09-15 NOTE — PROGRESS NOTES
Liliana Poole's chief complaint for this visit includes:  Chief Complaint   Patient presents with    Follow Up     Vestibular migraine, has been to rehab, some help. Also Flonase has helped ear pressure. One week ago had Vertigo. Today ear pressure and Left is worse. Also [place] palsy flared up.      PCP: Francisca Alves    Referring Provider:  Referred Self, MD  No address on file    BP (!) 133/92   Pulse 59

## 2023-10-05 ENCOUNTER — THERAPY VISIT (OUTPATIENT)
Dept: PHYSICAL THERAPY | Facility: CLINIC | Age: 53
End: 2023-10-05
Attending: OTOLARYNGOLOGY
Payer: COMMERCIAL

## 2023-10-05 DIAGNOSIS — H81.12 BENIGN PAROXYSMAL POSITIONAL VERTIGO, LEFT: ICD-10-CM

## 2023-10-05 PROCEDURE — 97112 NEUROMUSCULAR REEDUCATION: CPT | Mod: GP | Performed by: PHYSICAL THERAPIST

## 2023-10-05 PROCEDURE — 97161 PT EVAL LOW COMPLEX 20 MIN: CPT | Mod: GP | Performed by: PHYSICAL THERAPIST

## 2023-10-05 PROCEDURE — 97140 MANUAL THERAPY 1/> REGIONS: CPT | Mod: GP | Performed by: PHYSICAL THERAPIST

## 2023-10-05 NOTE — PROGRESS NOTES
PHYSICAL THERAPY EVALUATION  Type of Visit: Evaluation    See electronic medical record for Abuse and Falls Screening details.    Subjective       Presenting condition or subjective complaint: Vestibular issues/ vertigo.  Pt's main complaint is left ear pressure, like it needs to have a tube sanchez it.  Reports migraines since 13 y.o., calling them classic migraines with neuro/stroke type symptoms with an aura.  Pt is not sold on vestibular migraine evolvement of her symptoms. She had Meniere's Disease diagnosed in her 20's (by Dr Root); her mom also has Meniere's.  She is not on a diet to control it but does not put salt on any food and only drinks one pop a day.  Pt feels there is a trigeminal thing going on-- viral thing.   She was diagnosed with Bell's Palsy June 2022.  Her sinuses are clear on any imaging when they check.   Date of onset:  (years ago.  Giuliano duong 9/15/23.)    Relevant medical history:     Dates & types of surgery:      Prior diagnostic imaging/testing results: Other Testing at the [facility]   Prior therapy history for the same diagnosis, illness or injury: Yes Last year    Prior Level of Function  Transfers: Independent  Ambulation: Independent  ADL: Independent  IADL: Driving, Finances, Housekeeping, Laundry, Meal preparation, Medication management, Work    Living Environment  Social support:     Type of home:     Stairs to enter the home:         Ramp:     Stairs inside the home:         Help at home:    Equipment owned:       Employment:      Hobbies/Interests:      Patient goals for therapy: Make position changes    Pain assessment:  neck tension, HA (migraines)     Objective   Cognitive Status Examination  Orientation: Oriented to person, place and time   Level of Consciousness: Alert  Follows Commands and Answers Questions: 100% of the time  Personal Safety and Judgement: Intact  Memory: Intact    OBSERVATION: comes in unaided, unaccompanied  INTEGUMENTARY: Intact  POSTURE:  naturally  extends head on neck 6 degrees, then brings head to 20 degrees of extension on her own for a comfort spot but finds it gives her dizziness.    Head on neck protracted 5 cm. ( 22-17)  PALPATION: SO, masseter, temporalii, neck mm tightness bilat  RANGE OF MOTION: LE ROM WFL  UE ROM WFL  STRENGTH: LE Strength WFL  UE Strength WFL    BED MOBILITY: WNL    TRANSFERS: WNL    WHEELCHAIR MOBILITY: NA    GAIT:   Level of [place]: WNL  Assistive Device(s): None  Gait Deviations: WNL  Gait Distance:   Stairs:     BALANCE: WFL        SENSATION:  no complaints except in L face    REFLEXES:   COORDINATION:   MUSCLE TONE: Cervical tone abnormal--hypertonic       VESTIBULAR EVALUATION  ADDITIONAL HISTORY:  Description of symptoms:    Dizzy attacks:   Start:     Last attack:     Frequency of occurrences:     Length of attack:    Difficulty hearing:    Noise in ears?      Alleviates symptoms:    Worsens symptoms:    Activities that bring on symptoms:         Pertinent visual history: had Lasix years ago, only uses glasses for driving.  Pertinent history of current vestibular problem: Anxiety, Hearing loss, Migraines, Motion sickness  DHI: Total Score: 3636/100    Pt does also report falling off horse in past, years ago.    Cervicogenic Screen    Neck ROM Flex 32, Ext 52, R SB 22, L SB 30, R Rot 31, L Rot 51   Vertebral Artery Test    Alar Ligament Test Normal   Transverse Ligament Test Normal   Distraction    Neck Torsion Test (head still, body rotating)    Neck Torsion Test (head and body rotating)         Oculomotor Screen    Ocular ROM Normal but guarded to use body and moves en bloc   Smooth Pursuit Normal   Saccades    VOR Feels the begin of spinning and lean to left with faster, slow OK.   VOR Cancellation Also feels the spin begin and lean left with faster.    Head Impulse Test Normal   Convergence Testing Abnormal, 3 inches        Infrared Goggle Exam Vestibular Suppressant in Last 24 Hours? No  Exam Completed With:  Infrared goggles   Spontaneous Nystagmus Negative   Gaze Evoked Nystagmus Negative   Head Shake Horizontal Nystagmus Not tested today as pt becomes more anxious when sx's present or head moves--stiffens to control motion--needed more time for positional testing and did not revisit this today   Positional Testing    Supine Head-Hanging Test     Left Right   Trina-Hallpike Drift left 1 min in and more prominent 1-1/2 min in .   Pt feels disoriented with eyes like she needs to scan. Eyes drift R but pt feels oriented and does not feel eyes scanning.  Pt feels more nausea after sitting up and leans to left.     Sidelying Test     [facility] Supine Roll Test Negative Negative but a slow drift R in first 15 sec--one long drift with no correction. Feels neck so tight.    [facility] Forward Roll Test     [place] and Lean Test -  Sitting Erect    [place] and Lean Test - Seated, Head Bent 60 Degrees Forward    [place] and Lean Test - Seated, Head Bent Backwards       BPPV Canal(s): none  BPPV Type:     Dynamic Visual Acuity (DVA)    Static Acuity (LogMar)    Horizontal Head Movement at 1 Hz (LogMar)    Horizontal Head Movement at 2 Hz (LogMar)           Assessment & Plan   CLINICAL IMPRESSIONS  Medical Diagnosis: BPPV, left    Treatment Diagnosis: BPPV-L, vertigo   Impression/Assessment: Patient is a 53 year old female with left ear pressure, spinning and leaning to the left complaints.  The following significant findings have been identified: Instability, Dizziness, and Disequilibrium . These impairments interfere with their ability to perform self care tasks, work tasks, recreational activities, and household chores as compared to previous level of function.     Clinical Decision Making (Complexity):  Clinical Presentation: Stable/Uncomplicated  Clinical Presentation Rationale: based on medical and personal factors listed in PT evaluation  Clinical Decision Making (Complexity): Low complexity    PLAN OF CARE  Treatment Interventions:  Interventions:  Manual Therapy, Neuromuscular Re-education, Therapeutic Exercise    Long Term Goals     PT Goal 1  Goal Identifier: vertigo control  Goal Description: Janette will be able to control her vertigo episodes to report not having one for 1 week.  Rationale: to maximize safety and independence with performance of ADLs and functional tasks;to maximize safety and independence within the home;to maximize safety and independence within the community;to maximize safety and independence with transportation;to maximize safety and independence with self cares  Target Date: 11/10/23  PT Goal 2  Goal Identifier: home program  Goal Description: Janette will be indpendent with home program to help control sx's, including posture/positioning, mm tension control and ROM of neck and jaw.  Rationale: to maximize safety and independence with performance of ADLs and functional tasks;to maximize safety and independence within the home;to maximize safety and independence within the community;to maximize safety and independence with transportation;to maximize safety and independence with self cares  Target Date: 11/10/23      Frequency of Treatment: 1x/wk  Duration of Treatment: 6 weeks    Recommended Referrals to Other Professionals:   Education Assessment:   Learner/Method: Patient;Listening;Demonstration;No Barriers to Learning  Education Comments: plan, treatment    Risks and benefits of evaluation/treatment have been explained.   Patient/Family/caregiver agrees with Plan of Care.     Evaluation Time:     PT Eval, Low Complexity Minutes (75015): 25       Signing Clinician: Marion Estevez PT

## 2023-10-11 DIAGNOSIS — H81.02 COCHLEAR HYDROPS, LEFT: ICD-10-CM

## 2023-10-11 DIAGNOSIS — G43.809 VESTIBULAR MIGRAINE: Primary | ICD-10-CM

## 2023-10-11 DIAGNOSIS — G50.0 TRIGEMINAL NEURALGIA: ICD-10-CM

## 2023-10-11 RX ORDER — DIPHENHYDRAMINE HCL 25 MG
25 CAPSULE ORAL EVERY 6 HOURS PRN
Qty: 20 CAPSULE | Refills: 0 | Status: SHIPPED | OUTPATIENT
Start: 2023-10-11 | End: 2023-10-16

## 2023-10-19 ENCOUNTER — HOSPITAL ENCOUNTER (OUTPATIENT)
Dept: MRI IMAGING | Facility: CLINIC | Age: 53
Discharge: HOME | End: 2023-10-19
Attending: OTOLARYNGOLOGY | Admitting: OTOLARYNGOLOGY
Payer: COMMERCIAL

## 2023-10-19 DIAGNOSIS — H81.02 COCHLEAR HYDROPS, LEFT: ICD-10-CM

## 2023-10-19 DIAGNOSIS — G43.809 VESTIBULAR MIGRAINE: ICD-10-CM

## 2023-10-19 DIAGNOSIS — G50.0 TRIGEMINAL NEURALGIA: ICD-10-CM

## 2023-10-19 PROCEDURE — 70553 MRI BRAIN STEM W/O & W/DYE: CPT

## 2023-10-19 PROCEDURE — 255N000002 HC RX 255 OP 636: Mod: JZ | Performed by: OTOLARYNGOLOGY

## 2023-10-19 PROCEDURE — A9585 GADOBUTROL INJECTION: HCPCS | Mod: JZ | Performed by: OTOLARYNGOLOGY

## 2023-10-19 RX ORDER — GADOBUTROL 604.72 MG/ML
10 INJECTION INTRAVENOUS ONCE
Status: COMPLETED | OUTPATIENT
Start: 2023-10-19 | End: 2023-10-19

## 2023-10-19 RX ADMIN — GADOBUTROL 9.5 ML: 604.72 INJECTION INTRAVENOUS at 12:43

## 2023-10-20 ENCOUNTER — TELEPHONE (OUTPATIENT)
Dept: OTOLARYNGOLOGY | Facility: CLINIC | Age: 53
End: 2023-10-20
Payer: COMMERCIAL

## 2023-10-20 NOTE — TELEPHONE ENCOUNTER
"----- Message from Madiha Diaz MD sent at 10/20/2023  9:17 AM CDT -----  MRI showed no tumor or lesions at the cerebello-pontine angle or at the nerves. However,  \"A vascular structure (likely venous) contacts and slightly indents the lateral margin of the root entry/exit zone and proximal cisternal  segment of the left trigeminal nerve.\" This finding is not a pathologic one but a variation that may explain her sharp pain from time to time. I encourage her to see her neurologist regarding that.    "

## 2023-10-20 NOTE — TELEPHONE ENCOUNTER
Phone call to pt with MRI results showing no lesions or tumors around the hearing or balance areas of the inner ear, but explained that a vessel was noted to be very close to the facial nerve, which could be causing some symptoms. Discussed following up with pt's neurologist for further evaluation of finding.  Pt verbalized understanding of plan. Trang Morin RN

## 2023-10-23 ENCOUNTER — THERAPY VISIT (OUTPATIENT)
Dept: PHYSICAL THERAPY | Facility: CLINIC | Age: 53
End: 2023-10-23
Attending: OTOLARYNGOLOGY
Payer: COMMERCIAL

## 2023-10-23 DIAGNOSIS — R42 VERTIGO: Primary | ICD-10-CM

## 2023-10-23 PROCEDURE — 97140 MANUAL THERAPY 1/> REGIONS: CPT | Mod: GP | Performed by: PHYSICAL THERAPIST

## 2023-10-23 PROCEDURE — 97112 NEUROMUSCULAR REEDUCATION: CPT | Mod: GP | Performed by: PHYSICAL THERAPIST

## 2023-11-02 ENCOUNTER — THERAPY VISIT (OUTPATIENT)
Dept: PHYSICAL THERAPY | Facility: CLINIC | Age: 53
End: 2023-11-02
Attending: OTOLARYNGOLOGY
Payer: COMMERCIAL

## 2023-11-02 DIAGNOSIS — H81.10 BPPV (BENIGN PAROXYSMAL POSITIONAL VERTIGO): Primary | ICD-10-CM

## 2023-11-02 DIAGNOSIS — R42 VERTIGO: ICD-10-CM

## 2023-11-02 PROCEDURE — 97112 NEUROMUSCULAR REEDUCATION: CPT | Mod: GP | Performed by: PHYSICAL THERAPIST

## 2023-11-02 PROCEDURE — 97140 MANUAL THERAPY 1/> REGIONS: CPT | Mod: GP | Performed by: PHYSICAL THERAPIST

## 2023-11-10 ENCOUNTER — THERAPY VISIT (OUTPATIENT)
Dept: PHYSICAL THERAPY | Facility: CLINIC | Age: 53
End: 2023-11-10
Attending: OTOLARYNGOLOGY
Payer: COMMERCIAL

## 2023-11-10 DIAGNOSIS — H81.10 BPPV (BENIGN PAROXYSMAL POSITIONAL VERTIGO): Primary | ICD-10-CM

## 2023-11-10 DIAGNOSIS — R42 VERTIGO: ICD-10-CM

## 2023-11-10 PROCEDURE — 97140 MANUAL THERAPY 1/> REGIONS: CPT | Mod: GP | Performed by: PHYSICAL THERAPIST

## 2023-11-10 NOTE — PROGRESS NOTES
11/10/23 0500   Appointment Info   Signing clinician's name / credentials Marion Estevez, PT   Visits Used 4   Medical Diagnosis BPPV, left   PT Tx Diagnosis BPPV-L, vertigo   Progress Note/Certification   Onset of illness/injury or Date of Surgery   (years ago.  Order date 9/15/23.)   Therapy Frequency 1x/wk   Predicted Duration 6 weeks   Progress Note Completed Date 10/05/23   GOALS   PT Goals 2   PT Goal 1   Goal Identifier vertigo control   Goal Description Janette will be able to control her vertigo episodes to report not having one for 1 week.   Rationale to maximize safety and independence with performance of ADLs and functional tasks;to maximize safety and independence within the home;to maximize safety and independence within the community;to maximize safety and independence with transportation;to maximize safety and independence with self cares   Goal Progress 11/10/23: much better, not having the vertigo, but stilla pressure or presence of symptoms.   Target Date 11/10/23   PT Goal 2   Goal Identifier home program   Goal Description Janette will be independent with home program to help control sx's, including posture/positioning, mm tension control and ROM of neck and jaw.   Rationale to maximize safety and independence with performance of ADLs and functional tasks;to maximize safety and independence within the home;to maximize safety and independence within the community;to maximize safety and independence with transportation;to maximize safety and independence with self cares   Goal Progress 11/10/23: pt feels able to work with what she ahs learned on her own for now and will see neurology--but feels she is just waiting for another attack.   Target Date 11/10/23   Date Met 11/10/23   Subjective Report   Subjective Report I think this is longest/worst this cyclical thing.  It is tamped down but not gone and I am just waiting for the next round.  I think I will get shots but they'll tell me I'm not a  "candidate for Botox.  Still feels like the nerve is irritated (facial)--inidcates bilat for facial and head symptoms.  I felt pretty good after leaving last time; it felt looser and I could move.   Objective Measures   Objective Measures Objective Measure 1   Objective Measure 1   Objective Measure DHI   Details Eval 10/5/23 = 36/100   Neuromuscular Re-education   Neuromuscular re-ed of mvmt, balance, coord, kinesthetic sense, posture, proprioception minutes (79113) 3   Neuro Re-ed 1 - Details Relaxation ex for jaw/facial mm --\"home alone\"  hands on face ex done at end of session.   Skilled Intervention determining needs, education of needs, progression   Patient Response/Progress tolerates well   Manual Therapy   Manual Therapy: Mobilization, MFR, MLD, friction massage minutes (88661) 40   Manual Therapy 1 - Details Supported sitting and then supine for release to neck and face/jaw mm.   Skilled Intervention soft tissue mobilization   Patient Response/Progress tissues more pliable, feels better   Education   Learner/Method Patient;Listening;Demonstration;No Barriers to Learning   Education Comments plan, treatment   Plan   Home program roll in bed habituation   Plan for next session pt choosing to cont PT on own for now and seeing neurologist end of November.  D/C today.   Total Session Time   Timed Code Treatment Minutes 43   Total Treatment Time (sum of timed and untimed services) 43         DISCHARGE  Reason for Discharge: Patient chooses to discontinue therapy.    Equipment Issued: .    Discharge Plan: Patient to continue home program.  Pt will see neurology at end of month for HAs.     Referring Provider:  Madiha Diaz MD    "

## 2024-03-16 ENCOUNTER — HEALTH MAINTENANCE LETTER (OUTPATIENT)
Age: 54
End: 2024-03-16

## 2024-08-26 ENCOUNTER — TRANSCRIBE ORDERS (OUTPATIENT)
Dept: OTHER | Age: 54
End: 2024-08-26

## 2024-08-26 DIAGNOSIS — R53.1 LEFT-SIDED WEAKNESS: ICD-10-CM

## 2024-08-26 DIAGNOSIS — G51.0 FACIAL PARALYSIS: Primary | ICD-10-CM

## 2025-03-22 ENCOUNTER — HEALTH MAINTENANCE LETTER (OUTPATIENT)
Age: 55
End: 2025-03-22

## (undated) DEVICE — GLOVE PROTEXIS BLUE W/NEU-THERA 9.0  2D73EB90

## (undated) DEVICE — BLADE SHAVER ARTHRO 5.5MM CUDA C9255

## (undated) DEVICE — PACK EXTREMITY SOP15EXFSD

## (undated) DEVICE — SOL WATER IRRIG 1000ML BOTTLE 2F7114

## (undated) DEVICE — CAST PADDING 6" COTTON WEBRIL UNSTERILE 9086

## (undated) DEVICE — LINEN TOWEL PACK X5 5464

## (undated) DEVICE — STPL SKIN 35W ROTATING HEAD PRW35

## (undated) DEVICE — CAST PADDING 4" UNSTERILE 9044

## (undated) DEVICE — SOL NACL 0.9% IRRIG 1000ML BOTTLE 07138-09

## (undated) DEVICE — DRSG ABDOMINAL 07 1/2X8" 7197D

## (undated) DEVICE — GLOVE PROTEXIS POWDER FREE 8.0 ORTHOPEDIC 2D73ET80

## (undated) DEVICE — BNDG ELASTIC 4"X5YDS UNSTERILE 6611-40

## (undated) DEVICE — IMM LIMB ELEVATOR DC40-0203

## (undated) DEVICE — BNDG ELASTIC 4"X5YDS STERILE 6611-4S

## (undated) DEVICE — Device

## (undated) DEVICE — SYR 03ML LL W/O NDL 309657

## (undated) DEVICE — PREP DURAPREP 26ML APL 8630

## (undated) DEVICE — MANIFOLD NEPTUNE 4 PORT 700-20

## (undated) DEVICE — GLOVE PROTEXIS BLUE W/NEU-THERA 8.0  2D73EB80

## (undated) DEVICE — SU SHUTTLE ACCU-PASS MONOFILAMENT #1 72201406

## (undated) DEVICE — GLOVE PROTEXIS W/NEU-THERA 7.5  2D73TE75

## (undated) DEVICE — GLOVE PROTEXIS W/NEU-THERA 8.0  2D73TE80

## (undated) DEVICE — TUBING ARTHRO CONMED/LINVATEC PUMP BLUE INFLOW 10K100

## (undated) DEVICE — ESU VULCAN PROBE EFLEX ABLATOR 72200683

## (undated) DEVICE — DEVICE PASSER SHUTTLE ACCUPASS S&N 70DEG UP 72200419

## (undated) DEVICE — BLADE ARTHRO BEAVER BANANA 376984

## (undated) DEVICE — SU ETHILON 2-0 FS 18" 664H

## (undated) DEVICE — SOL NACL 0.9% IRRIG 3000ML BAG 2B7477

## (undated) DEVICE — SUTURE PASSER 12" 72201406

## (undated) DEVICE — ARTHROSCOPIC CANNULA CLEAR-TRAC 7.0X110MM 72200439

## (undated) DEVICE — GLOVE PROTEXIS W/NEU-THERA 8.5  2D73TE85

## (undated) DEVICE — BLADE SHAVER ARTHRO 4.2MM ULTRACUT C9405A

## (undated) DEVICE — BUR DYONICS ABRADER 5.5X180MM LONG 72200082

## (undated) DEVICE — ESU GROUND PAD UNIVERSAL W/O CORD

## (undated) DEVICE — SU ETHILON 3-0 FS-1 18" 669H

## (undated) DEVICE — SU COBRAID ULTRABRAID 2 38" 7210915

## (undated) DEVICE — ESU PENCIL W/HOLSTER

## (undated) DEVICE — DRSG BANDAID 1X3" FABRIC CURITY LATEX FREE KC44101

## (undated) DEVICE — TUBING SUCTION SOFT 20'X3/16" 0036570

## (undated) DEVICE — GOWN XXLG REINFORCED 9071EL

## (undated) DEVICE — BLADE DYONICS INCISOR CVD 4.5X180MM LONG 72200494

## (undated) DEVICE — GLOVE PROTEXIS MICRO 8.5  2D73PM85

## (undated) DEVICE — DRSG GAUZE 4X4" 3033

## (undated) DEVICE — NDL 19GA 1.5"

## (undated) DEVICE — GLOVE PROTEXIS POWDER FREE 9.0 ORTHOPEDIC 2D73ET90

## (undated) RX ORDER — FENTANYL CITRATE 50 UG/ML
INJECTION, SOLUTION INTRAMUSCULAR; INTRAVENOUS
Status: DISPENSED
Start: 2019-12-27

## (undated) RX ORDER — PROPOFOL 10 MG/ML
INJECTION, EMULSION INTRAVENOUS
Status: DISPENSED
Start: 2019-12-27

## (undated) RX ORDER — ONDANSETRON 2 MG/ML
INJECTION INTRAMUSCULAR; INTRAVENOUS
Status: DISPENSED
Start: 2018-12-10

## (undated) RX ORDER — FENTANYL CITRATE 50 UG/ML
INJECTION, SOLUTION INTRAMUSCULAR; INTRAVENOUS
Status: DISPENSED
Start: 2018-12-10

## (undated) RX ORDER — HYDROMORPHONE HYDROCHLORIDE 1 MG/ML
INJECTION, SOLUTION INTRAMUSCULAR; INTRAVENOUS; SUBCUTANEOUS
Status: DISPENSED
Start: 2019-12-27

## (undated) RX ORDER — FENTANYL CITRATE 0.05 MG/ML
INJECTION, SOLUTION INTRAMUSCULAR; INTRAVENOUS
Status: DISPENSED
Start: 2019-12-27

## (undated) RX ORDER — PROPOFOL 10 MG/ML
INJECTION, EMULSION INTRAVENOUS
Status: DISPENSED
Start: 2018-12-10

## (undated) RX ORDER — CLINDAMYCIN PHOSPHATE 900 MG/50ML
INJECTION, SOLUTION INTRAVENOUS
Status: DISPENSED
Start: 2019-12-27

## (undated) RX ORDER — HYDROCODONE BITARTRATE AND ACETAMINOPHEN 5; 325 MG/1; MG/1
TABLET ORAL
Status: DISPENSED
Start: 2018-12-10

## (undated) RX ORDER — APREPITANT 40 MG/1
CAPSULE ORAL
Status: DISPENSED
Start: 2018-12-10

## (undated) RX ORDER — LIDOCAINE HYDROCHLORIDE 10 MG/ML
INJECTION, SOLUTION EPIDURAL; INFILTRATION; INTRACAUDAL; PERINEURAL
Status: DISPENSED
Start: 2019-12-27

## (undated) RX ORDER — ONDANSETRON 2 MG/ML
INJECTION INTRAMUSCULAR; INTRAVENOUS
Status: DISPENSED
Start: 2019-12-27

## (undated) RX ORDER — LIDOCAINE HYDROCHLORIDE 20 MG/ML
INJECTION, SOLUTION EPIDURAL; INFILTRATION; INTRACAUDAL; PERINEURAL
Status: DISPENSED
Start: 2018-12-10

## (undated) RX ORDER — CEFAZOLIN SODIUM 2 G/100ML
INJECTION, SOLUTION INTRAVENOUS
Status: DISPENSED
Start: 2018-12-10

## (undated) RX ORDER — DEXAMETHASONE SODIUM PHOSPHATE 4 MG/ML
INJECTION, SOLUTION INTRA-ARTICULAR; INTRALESIONAL; INTRAMUSCULAR; INTRAVENOUS; SOFT TISSUE
Status: DISPENSED
Start: 2018-12-10

## (undated) RX ORDER — HYDROXYZINE HYDROCHLORIDE 25 MG/1
TABLET, FILM COATED ORAL
Status: DISPENSED
Start: 2019-12-27

## (undated) RX ORDER — LIDOCAINE HYDROCHLORIDE 20 MG/ML
INJECTION, SOLUTION EPIDURAL; INFILTRATION; INTRACAUDAL; PERINEURAL
Status: DISPENSED
Start: 2019-12-27